# Patient Record
Sex: FEMALE | Race: WHITE | NOT HISPANIC OR LATINO | Employment: OTHER | URBAN - METROPOLITAN AREA
[De-identification: names, ages, dates, MRNs, and addresses within clinical notes are randomized per-mention and may not be internally consistent; named-entity substitution may affect disease eponyms.]

---

## 2018-04-16 LAB
ALBUMIN SERPL BCP-MCNC: 3.9 G/DL (ref 3.5–5.7)
ALP SERPL-CCNC: 45 IU/L (ref 55–165)
ALT SERPL W P-5'-P-CCNC: 18 IU/L (ref 9–28)
ANION GAP SERPL CALCULATED.3IONS-SCNC: 11.9 MM/L
AST SERPL W P-5'-P-CCNC: 25 U/L (ref 7–26)
BASOPHILS # BLD AUTO: 0 X3/UL (ref 0–0.3)
BASOPHILS # BLD AUTO: 0.6 % (ref 0–2)
BILIRUB SERPL-MCNC: 0.4 MG/DL (ref 0.3–1)
BUN SERPL-MCNC: 21 MG/DL (ref 7–25)
CALCIUM SERPL-MCNC: 9.6 MG/DL (ref 8.6–10.5)
CHLORIDE SERPL-SCNC: 103 MM/L (ref 98–107)
CHOLEST SERPL-MCNC: 166 MG/DL (ref 0–200)
CO2 SERPL-SCNC: 32 MM/L (ref 21–31)
CREAT SERPL-MCNC: 0.85 MG/DL (ref 0.6–1.2)
DEPRECATED RDW RBC AUTO: 14.7 % (ref 11.5–14.5)
EGFR (HISTORICAL): > 60 GFR
EGFR AFRICAN AMERICAN (HISTORICAL): > 60 GFR
EOSINOPHIL # BLD AUTO: 0.1 X3/UL (ref 0–0.5)
EOSINOPHIL NFR BLD AUTO: 1.7 % (ref 0–5)
GLUCOSE (HISTORICAL): 89 MG/DL (ref 65–99)
HCT VFR BLD AUTO: 39.2 % (ref 37–47)
HDLC SERPL-MCNC: 67 MG/DL (ref 40–60)
HGB BLD-MCNC: 12.8 G/DL (ref 12–16)
LDLC SERPL CALC-MCNC: 83.1 MG/DL (ref 75–193)
LYMPHOCYTES # BLD AUTO: 1.9 X3/UL (ref 1.2–4.2)
LYMPHOCYTES NFR BLD AUTO: 32.2 % (ref 20.5–51.1)
MCH RBC QN AUTO: 29.8 PG (ref 26–34)
MCHC RBC AUTO-ENTMCNC: 32.6 G/DL (ref 31–36)
MCV RBC AUTO: 91.1 FL (ref 81–99)
MONOCYTES # BLD AUTO: 0.6 X3/UL (ref 0–1)
MONOCYTES NFR BLD AUTO: 10.2 % (ref 1.7–12)
NEUTROPHILS # BLD AUTO: 3.2 X3/UL (ref 1.4–6.5)
NEUTS SEG NFR BLD AUTO: 55.3 % (ref 42.2–75.2)
OSMOLALITY, SERUM (HISTORICAL): 287 MOSM (ref 262–291)
PLATELET # BLD AUTO: 278 X3/UL (ref 130–400)
PMV BLD AUTO: 7.9 FL (ref 8.6–11.7)
POTASSIUM SERPL-SCNC: 3.9 MM/L (ref 3.5–5.5)
RBC # BLD AUTO: 4.31 X6/UL (ref 3.9–5.2)
SODIUM SERPL-SCNC: 143 MM/L (ref 134–143)
T4 TOTAL (HISTORICAL): 8.13 UG/DL (ref 6.1–12.2)
TOTAL PROTEIN (HISTORICAL): 6.3 G/DL (ref 6.4–8.9)
TRIGL SERPL-MCNC: 82 MG/DL (ref 44–166)
TSH SERPL DL<=0.05 MIU/L-ACNC: 1.79 UIU/M (ref 0.45–5.33)
URIC ACID (HISTORICAL): 6.7 MG/DL (ref 2.3–7.6)
VLDL CHOLESTEROL (HISTORICAL): 16 MG/DL (ref 5–51)
WBC # BLD AUTO: 5.9 X3/UL (ref 4.8–10.8)

## 2018-11-14 DIAGNOSIS — I10 ESSENTIAL HYPERTENSION: ICD-10-CM

## 2018-11-14 DIAGNOSIS — R60.9 EDEMA, UNSPECIFIED TYPE: Primary | ICD-10-CM

## 2018-11-14 RX ORDER — FUROSEMIDE 40 MG/1
40 TABLET ORAL DAILY
Qty: 90 TABLET | Refills: 3 | Status: SHIPPED | OUTPATIENT
Start: 2018-11-14 | End: 2019-02-15 | Stop reason: SDUPTHER

## 2018-11-14 RX ORDER — METOPROLOL TARTRATE 100 MG/1
100 TABLET ORAL EVERY 12 HOURS SCHEDULED
Qty: 180 TABLET | Refills: 3 | Status: SHIPPED | OUTPATIENT
Start: 2018-11-14 | End: 2019-02-15 | Stop reason: SDUPTHER

## 2018-11-14 NOTE — TELEPHONE ENCOUNTER
Please check patient's old records through remains in charts if necessary to obtain the proper dosage in medication    She may have 90 day supply with 3 refills on both medications Lasix and metoprolol

## 2019-02-15 DIAGNOSIS — R60.9 EDEMA, UNSPECIFIED TYPE: ICD-10-CM

## 2019-02-15 DIAGNOSIS — I10 ESSENTIAL HYPERTENSION: ICD-10-CM

## 2019-02-15 RX ORDER — FUROSEMIDE 40 MG/1
40 TABLET ORAL DAILY
Qty: 90 TABLET | Refills: 3 | Status: SHIPPED | OUTPATIENT
Start: 2019-02-15 | End: 2019-11-15 | Stop reason: SDUPTHER

## 2019-02-15 RX ORDER — METOPROLOL TARTRATE 100 MG/1
100 TABLET ORAL EVERY 12 HOURS SCHEDULED
Qty: 180 TABLET | Refills: 3 | Status: SHIPPED | OUTPATIENT
Start: 2019-02-15 | End: 2019-11-15 | Stop reason: SDUPTHER

## 2019-02-27 ENCOUNTER — APPOINTMENT (OUTPATIENT)
Dept: LAB | Facility: CLINIC | Age: 74
End: 2019-02-27
Payer: MEDICARE

## 2019-02-27 ENCOUNTER — TRANSCRIBE ORDERS (OUTPATIENT)
Dept: ADMINISTRATIVE | Facility: HOSPITAL | Age: 74
End: 2019-02-27

## 2019-02-27 DIAGNOSIS — E78.5 HYPERLIPIDEMIA, UNSPECIFIED HYPERLIPIDEMIA TYPE: ICD-10-CM

## 2019-02-27 DIAGNOSIS — I10 HYPERTENSION, UNSPECIFIED TYPE: ICD-10-CM

## 2019-02-27 DIAGNOSIS — E78.5 HYPERLIPIDEMIA, UNSPECIFIED HYPERLIPIDEMIA TYPE: Primary | ICD-10-CM

## 2019-02-27 LAB
ALBUMIN SERPL BCP-MCNC: 3.7 G/DL (ref 3.5–5)
ALP SERPL-CCNC: 66 U/L (ref 46–116)
ALT SERPL W P-5'-P-CCNC: 33 U/L (ref 12–78)
ANION GAP SERPL CALCULATED.3IONS-SCNC: 7 MMOL/L (ref 4–13)
AST SERPL W P-5'-P-CCNC: 26 U/L (ref 5–45)
BILIRUB SERPL-MCNC: 0.57 MG/DL (ref 0.2–1)
BUN SERPL-MCNC: 32 MG/DL (ref 5–25)
CALCIUM SERPL-MCNC: 9.3 MG/DL (ref 8.3–10.1)
CHLORIDE SERPL-SCNC: 104 MMOL/L (ref 100–108)
CHOLEST SERPL-MCNC: 196 MG/DL (ref 50–200)
CO2 SERPL-SCNC: 30 MMOL/L (ref 21–32)
CREAT SERPL-MCNC: 0.99 MG/DL (ref 0.6–1.3)
GFR SERPL CREATININE-BSD FRML MDRD: 56 ML/MIN/1.73SQ M
GLUCOSE P FAST SERPL-MCNC: 83 MG/DL (ref 65–99)
HDLC SERPL-MCNC: 74 MG/DL (ref 40–60)
LDLC SERPL CALC-MCNC: 107 MG/DL (ref 0–100)
NONHDLC SERPL-MCNC: 122 MG/DL
POTASSIUM SERPL-SCNC: 4 MMOL/L (ref 3.5–5.3)
PROT SERPL-MCNC: 7.6 G/DL (ref 6.4–8.2)
SODIUM SERPL-SCNC: 141 MMOL/L (ref 136–145)
TRIGL SERPL-MCNC: 74 MG/DL
TSH SERPL DL<=0.05 MIU/L-ACNC: 2.03 UIU/ML (ref 0.36–3.74)

## 2019-02-27 PROCEDURE — 80053 COMPREHEN METABOLIC PANEL: CPT

## 2019-02-27 PROCEDURE — 80061 LIPID PANEL: CPT

## 2019-02-27 PROCEDURE — 84443 ASSAY THYROID STIM HORMONE: CPT

## 2019-02-27 PROCEDURE — 36415 COLL VENOUS BLD VENIPUNCTURE: CPT

## 2019-03-05 ENCOUNTER — OFFICE VISIT (OUTPATIENT)
Dept: FAMILY MEDICINE CLINIC | Facility: CLINIC | Age: 74
End: 2019-03-05
Payer: MEDICARE

## 2019-03-05 VITALS
OXYGEN SATURATION: 94 % | SYSTOLIC BLOOD PRESSURE: 140 MMHG | WEIGHT: 207.4 LBS | HEIGHT: 60 IN | DIASTOLIC BLOOD PRESSURE: 84 MMHG | BODY MASS INDEX: 40.72 KG/M2 | HEART RATE: 64 BPM

## 2019-03-05 DIAGNOSIS — I10 ESSENTIAL HYPERTENSION: Primary | ICD-10-CM

## 2019-03-05 DIAGNOSIS — K58.2 IRRITABLE BOWEL SYNDROME WITH BOTH CONSTIPATION AND DIARRHEA: ICD-10-CM

## 2019-03-05 DIAGNOSIS — E78.2 MIXED HYPERLIPIDEMIA: ICD-10-CM

## 2019-03-05 DIAGNOSIS — M15.9 OSTEOARTHRITIS, GENERALIZED: ICD-10-CM

## 2019-03-05 DIAGNOSIS — F41.1 GENERALIZED ANXIETY DISORDER: ICD-10-CM

## 2019-03-05 PROCEDURE — 99214 OFFICE O/P EST MOD 30 MIN: CPT | Performed by: FAMILY MEDICINE

## 2019-03-05 RX ORDER — MELOXICAM 15 MG/1
15 TABLET ORAL DAILY
COMMUNITY
End: 2019-07-16

## 2019-03-05 RX ORDER — DICYCLOMINE HYDROCHLORIDE 10 MG/1
10 CAPSULE ORAL
COMMUNITY
End: 2019-07-16

## 2019-03-05 RX ORDER — TELMISARTAN 80 MG/1
80 TABLET ORAL DAILY
COMMUNITY
End: 2019-08-13 | Stop reason: SDUPTHER

## 2019-03-05 RX ORDER — MULTIVIT-MIN/IRON FUM/FOLIC AC 7.5 MG-4
1 TABLET ORAL DAILY
COMMUNITY

## 2019-03-05 RX ORDER — ALPRAZOLAM 0.5 MG/1
0.5 TABLET ORAL
COMMUNITY
End: 2019-07-16

## 2019-03-05 NOTE — ASSESSMENT & PLAN NOTE
Irritable bowel syndrome controlled at this time with bowel movements regulated with Bentyl dicyclomine doing relatively well continue with diet follow-up in 4 months no change at this time

## 2019-03-05 NOTE — ASSESSMENT & PLAN NOTE
Generalized anxiety disorder takes alprazolam on occasion as needed for anxiety or lack of sleep she is doing well otherwise continue medication as directed and as needed and follow up at next office visit in 4 months

## 2019-03-05 NOTE — ASSESSMENT & PLAN NOTE
Essential hypertension blood pressure well controlled continue on current medication no change at this time follow-up in 4 months

## 2019-03-05 NOTE — PATIENT INSTRUCTIONS

## 2019-03-05 NOTE — ASSESSMENT & PLAN NOTE
Generalized osteoarthritis she needs to work on weight loss which has been a longstanding issue for her she also has exercise regularly as much as tolerated due to her arthritis she has limitations in how much she can move    She knows to stretch and take the meloxicam as needed for anti-inflammatory pain relief

## 2019-03-05 NOTE — PROGRESS NOTES
Assessment/Plan:       Problem List Items Addressed This Visit        Digestive    Irritable bowel syndrome with both constipation and diarrhea     Irritable bowel syndrome controlled at this time with bowel movements regulated with Bentyl dicyclomine doing relatively well continue with diet follow-up in 4 months no change at this time            Cardiovascular and Mediastinum    Essential hypertension - Primary     Essential hypertension blood pressure well controlled continue on current medication no change at this time follow-up in 4 months         Relevant Medications    telmisartan (MICARDIS) 80 MG tablet       Musculoskeletal and Integument    Osteoarthritis, generalized     Generalized osteoarthritis she needs to work on weight loss which has been a longstanding issue for her she also has exercise regularly as much as tolerated due to her arthritis she has limitations in how much she can move  She knows to stretch and take the meloxicam as needed for anti-inflammatory pain relief            Other    Generalized anxiety disorder     Generalized anxiety disorder takes alprazolam on occasion as needed for anxiety or lack of sleep she is doing well otherwise continue medication as directed and as needed and follow up at next office visit in 4 months         Relevant Medications    ALPRAZolam (XANAX) 0 5 mg tablet    Mixed hyperlipidemia     Patient has hyperlipidemia review of lab work shows her total cholesterol 196 but good HDL is at 68 which is great she will continue on the pravastatin at this time and follow-up lab work in the summer                 Subjective:      Patient ID: Maxi Tyler is a 66 y o  female  Patient presents today for follow-up evaluation blood pressure check and medication renewals and review  Also review of all lab work done recently    She has been doing well overall occasional joint pains and a runny nose recently from the weather otherwise no new problems to discuss at this time       The following portions of the patient's history were reviewed and updated as appropriate: allergies, current medications, past family history, past medical history, past social history, past surgical history and problem list     Review of Systems   Constitutional: Negative for chills, fatigue and fever  HENT: Positive for postnasal drip  Negative for congestion, nosebleeds, rhinorrhea, sinus pressure and sore throat  Eyes: Negative for discharge and redness  Respiratory: Negative for cough and shortness of breath  Cardiovascular: Negative for chest pain, palpitations and leg swelling  Gastrointestinal: Negative for abdominal pain, blood in stool and nausea  Endocrine: Negative for cold intolerance, heat intolerance and polyuria  Genitourinary: Negative for dysuria and frequency  Musculoskeletal: Positive for arthralgias  Negative for back pain and myalgias  Skin: Negative for rash  Neurological: Negative for dizziness, weakness and headaches  Hematological: Negative for adenopathy  Psychiatric/Behavioral: Negative for behavioral problems and sleep disturbance  The patient is not nervous/anxious  Objective:      /84 (BP Location: Left arm, Patient Position: Sitting)   Pulse 64   Ht 5' (1 524 m)   Wt 94 1 kg (207 lb 6 4 oz)   SpO2 94%   BMI 40 51 kg/m²        Physical Exam   Constitutional: She is oriented to person, place, and time  She appears well-developed and well-nourished  No distress  HENT:   Head: Normocephalic and atraumatic  Right Ear: External ear normal    Left Ear: External ear normal    Nose: Nose normal    Mouth/Throat: Oropharynx is clear and moist  No oropharyngeal exudate  Eyes: Pupils are equal, round, and reactive to light  Conjunctivae and EOM are normal  Right eye exhibits no discharge  Left eye exhibits no discharge  No scleral icterus  Neck: Normal range of motion  No JVD present  No thyromegaly present     Cardiovascular: Normal rate, regular rhythm and normal heart sounds  No murmur heard  Pulmonary/Chest: Effort normal  She has no wheezes  She has no rales  She exhibits no tenderness  Abdominal: Soft  Bowel sounds are normal  She exhibits distension  She exhibits no mass  There is no tenderness  Musculoskeletal: Normal range of motion  She exhibits edema and tenderness  She exhibits no deformity  Lymphadenopathy:     She has no cervical adenopathy  Neurological: She is alert and oriented to person, place, and time  She has normal reflexes  She displays normal reflexes  No cranial nerve deficit  Coordination normal    Skin: Skin is warm and dry  No rash noted  Psychiatric: She has a normal mood and affect  Her behavior is normal  Judgment and thought content normal    Nursing note and vitals reviewed  Data:    Laboratory Results: I have personally reviewed the pertinent laboratory results/reports   Radiology/Other Diagnostic Testing Results: I have personally reviewed pertinent reports         Lab Results   Component Value Date    WBC 5 9 04/16/2018    HGB 12 8 04/16/2018    HCT 39 2 04/16/2018    MCV 91 1 04/16/2018     04/16/2018     Lab Results   Component Value Date     04/16/2018    K 4 0 02/27/2019     02/27/2019    CO2 30 02/27/2019    ANIONGAP 11 9 04/16/2018    BUN 32 (H) 02/27/2019    CREATININE 0 99 02/27/2019    GLUF 83 02/27/2019    CALCIUM 9 3 02/27/2019    AST 26 02/27/2019    ALT 33 02/27/2019    ALKPHOS 66 02/27/2019    PROT 6 3 (L) 04/16/2018    BILITOT 0 4 04/16/2018    EGFR 56 02/27/2019     Lab Results   Component Value Date    CHOLESTEROL 196 02/27/2019     Lab Results   Component Value Date    HDL 74 (H) 02/27/2019    HDL 67 (H) 04/16/2018     Lab Results   Component Value Date    LDLCALC 107 (H) 02/27/2019    LDLCALC 83 1 04/16/2018     Lab Results   Component Value Date    TRIG 74 02/27/2019    TRIG 82 04/16/2018     No results found for: [place], [place]  Lab Results   Component Value Date    [id number] 2 030 02/27/2019     No results found for: HGBA1C  No results found for: PSA    Elizabeth Baxter DO

## 2019-03-05 NOTE — ASSESSMENT & PLAN NOTE
Patient has hyperlipidemia review of lab work shows her total cholesterol 196 but good HDL is at 68 which is great she will continue on the pravastatin at this time and follow-up lab work in the summer

## 2019-05-02 ENCOUNTER — TELEPHONE (OUTPATIENT)
Dept: FAMILY MEDICINE CLINIC | Facility: CLINIC | Age: 74
End: 2019-05-02

## 2019-05-02 DIAGNOSIS — Z12.39 BREAST SCREENING, UNSPECIFIED: Primary | ICD-10-CM

## 2019-05-07 DIAGNOSIS — E78.2 MIXED HYPERLIPIDEMIA: Primary | ICD-10-CM

## 2019-05-07 RX ORDER — PRAVASTATIN SODIUM 40 MG
TABLET ORAL
Qty: 90 TABLET | Refills: 3 | Status: SHIPPED | OUTPATIENT
Start: 2019-05-07 | End: 2020-02-18 | Stop reason: ALTCHOICE

## 2019-06-24 ENCOUNTER — HOSPITAL ENCOUNTER (OUTPATIENT)
Dept: MAMMOGRAPHY | Facility: HOSPITAL | Age: 74
Discharge: HOME | End: 2019-06-24
Attending: FAMILY MEDICINE
Payer: MEDICARE

## 2019-06-24 VITALS — BODY MASS INDEX: 40.64 KG/M2 | HEIGHT: 60 IN | WEIGHT: 207 LBS

## 2019-06-24 DIAGNOSIS — Z12.39 BREAST SCREENING, UNSPECIFIED: ICD-10-CM

## 2019-06-24 PROCEDURE — 77067 SCR MAMMO BI INCL CAD: CPT

## 2019-06-24 PROCEDURE — 77063 BREAST TOMOSYNTHESIS BI: CPT

## 2019-07-02 ENCOUNTER — TELEPHONE (OUTPATIENT)
Dept: FAMILY MEDICINE CLINIC | Facility: CLINIC | Age: 74
End: 2019-07-02

## 2019-07-16 ENCOUNTER — OFFICE VISIT (OUTPATIENT)
Dept: FAMILY MEDICINE CLINIC | Facility: CLINIC | Age: 74
End: 2019-07-16
Payer: MEDICARE

## 2019-07-16 VITALS
BODY MASS INDEX: 41.43 KG/M2 | OXYGEN SATURATION: 92 % | TEMPERATURE: 97.9 F | HEIGHT: 60 IN | HEART RATE: 70 BPM | SYSTOLIC BLOOD PRESSURE: 138 MMHG | WEIGHT: 211 LBS | DIASTOLIC BLOOD PRESSURE: 72 MMHG

## 2019-07-16 DIAGNOSIS — M15.9 OSTEOARTHRITIS, GENERALIZED: ICD-10-CM

## 2019-07-16 DIAGNOSIS — M25.472 ANKLE EDEMA, BILATERAL: ICD-10-CM

## 2019-07-16 DIAGNOSIS — M25.471 ANKLE EDEMA, BILATERAL: ICD-10-CM

## 2019-07-16 DIAGNOSIS — E78.2 MIXED HYPERLIPIDEMIA: ICD-10-CM

## 2019-07-16 DIAGNOSIS — I10 ESSENTIAL HYPERTENSION: Primary | ICD-10-CM

## 2019-07-16 PROCEDURE — 99214 OFFICE O/P EST MOD 30 MIN: CPT | Performed by: FAMILY MEDICINE

## 2019-07-16 RX ORDER — LANOLIN ALCOHOL/MO/W.PET/CERES
CREAM (GRAM) TOPICAL DAILY
COMMUNITY

## 2019-07-16 RX ORDER — ASPIRIN 81 MG/1
81 TABLET ORAL DAILY
COMMUNITY

## 2019-07-16 RX ORDER — MULTIVITAMIN WITH IRON
100 TABLET ORAL DAILY
COMMUNITY
End: 2020-02-18 | Stop reason: ALTCHOICE

## 2019-07-16 NOTE — PATIENT INSTRUCTIONS
Edema   WHAT YOU NEED TO KNOW:   Edema is swelling throughout your body  Edema is usually a sign that you are retaining fluid  The swelling may be caused by heart failure or kidney, thyroid, or liver disease  It may also be caused by medicines such as antidepressants, blood pressure medicines, or hormones  Sudden swelling around the lips or face may be a sign of a severe allergic reaction  Swelling of an arm or leg may be caused by blockage of your veins  DISCHARGE INSTRUCTIONS:   Return to the emergency department if:   · You have shortness of breath at rest, especially when you lie down  · You cough up pink, foamy sputum  · You have chest pain  · Your heartbeat is fast or uneven  Contact your healthcare provider if:   · The swollen area feels cold and is pale or blue in color  · The swollen area feels warm, painful, and is red in color  · You have increased swelling or swelling in other parts of your body  · You have questions or concerns about your condition or care  Medicines:   · Medicines  help to get rid of extra body fluid  · Take your medicine as directed  Contact your healthcare provider if you think your medicine is not helping or if you have side effects  Tell him or her if you are allergic to any medicine  Keep a list of the medicines, vitamins, and herbs you take  Include the amounts, and when and why you take them  Bring the list or the pill bottles to follow-up visits  Carry your medicine list with you in case of an emergency  Follow up with your healthcare provider as directed:  Write down your questions so you remember to ask them during your visits  Manage edema:   · Elevate  your arms or legs as directed  Raise them above the level of your heart as often as you can  This will help decrease swelling and pain  Prop them on pillows or blankets to keep them elevated comfortably  · Wear pressure stockings as directed    The stockings are tight and put pressure on your legs  This helps to keep fluid from collecting in your legs or ankles  · Limit your salt intake  Salt causes your body to hold water  Ask about any other changes to your diet  · Stay active  Do not stand or sit for long periods of time  Ask your healthcare provider about the best exercise plan for you  · Keep your skin moist  using lotion, cream, or ointment  Ask your healthcare provider what to use and how often to use it  © 2017 [address] [facility] Information is for End User's use only and may not be sold, redistributed or otherwise used for commercial purposes  All illustrations and images included in CareNotes® are the copyrighted property of A D A M , Inc  or Samuel Huffman  The above information is an  only  It is not intended as medical advice for individual conditions or treatments  Talk to your doctor, nurse or pharmacist before following any medical regimen to see if it is safe and effective for you

## 2019-07-16 NOTE — ASSESSMENT & PLAN NOTE
Mixed hyperlipidemia continue with current medication at this time avoiding saturated fats continue with Pravachol 40 mg daily and check lipid profile in 4 months

## 2019-07-16 NOTE — PROGRESS NOTES
Assessment/Plan:       Problem List Items Addressed This Visit        Cardiovascular and Mediastinum    Essential hypertension - Primary     Essential hypertension under relatively good control at this time she needs to avoid sodium recently was traveling on vacation at the [facility] and notes that her feet have become more swollen I do recommend avoiding sodium in her diet this will help with the foot edema as well as keeping the blood pressure under better control  She will continue with her current medications at this time no changes            Musculoskeletal and Integument    Osteoarthritis, generalized     Generalized osteoarthritis continue with current regimen keep weight down exercise regularly and use ibuprofen as needed p r n  For arthritic flare ups            Other    Mixed hyperlipidemia     Mixed hyperlipidemia continue with current medication at this time avoiding saturated fats continue with Pravachol 40 mg daily and check lipid profile in 4 months         Ankle edema, bilateral     Bilateral ankle edema at this point try the copperfit support socks as well as proper shoes she will be traveling to [facility] taking a cruise and coming back to [place] over 16 day course she will need to wear good shoes and support socks for this she will be following up with Podiatry as well I feel that this is due to venous insufficiency valvular incompetence at the lower level her legs gastroc region calf muscles all show no edema she has some varicosities on both lower extremities the right leg is worse  She is already taking Lasix , I dont want to add another diuretic to her regimen her blood pressure is under good control  I will schedule her for a venous ultrasound                 Subjective:      Patient ID: Oksana Marroquin is a 66 y o  female      Patient presents today for general checkup blood pressure review medication renewals she recently returned from the [facility] and notes that her feet have been more swollen and would like this addressed at this point      The following portions of the patient's history were reviewed and updated as appropriate: allergies, current medications, past family history, past medical history, past social history, past surgical history and problem list     Review of Systems   Constitutional: Negative for chills, fatigue and fever  HENT: Negative for congestion, nosebleeds, rhinorrhea, sinus pressure and sore throat  Eyes: Negative for discharge and redness  Respiratory: Negative for cough and shortness of breath  Cardiovascular: Positive for leg swelling  Negative for chest pain and palpitations  Gastrointestinal: Negative for abdominal pain, blood in stool and nausea  Endocrine: Negative for cold intolerance, heat intolerance and polyuria  Genitourinary: Negative for dysuria and frequency  Musculoskeletal: Negative for arthralgias, back pain and myalgias  Skin: Negative for rash  Neurological: Negative for dizziness, weakness and headaches  Hematological: Negative for adenopathy  Psychiatric/Behavioral: Negative for behavioral problems and sleep disturbance  The patient is not nervous/anxious  Objective:      /72 (BP Location: Left arm, Patient Position: Sitting)   Pulse 70   Temp 97 9 °F (36 6 °C) (Tympanic)   Ht 5' (1 524 m)   Wt 95 7 kg (211 lb)   SpO2 92%   BMI 41 21 kg/m²        Physical Exam   Constitutional: She is oriented to person, place, and time  She appears well-developed and well-nourished  No distress  HENT:   Head: Normocephalic and atraumatic  Right Ear: External ear normal    Left Ear: External ear normal    Nose: Nose normal    Mouth/Throat: Oropharynx is clear and moist  No oropharyngeal exudate  Eyes: Pupils are equal, round, and reactive to light  Conjunctivae and EOM are normal  Right eye exhibits no discharge  Left eye exhibits no discharge  No scleral icterus  Neck: Normal range of motion   No JVD present  No thyromegaly present  Cardiovascular: Normal rate, regular rhythm and normal heart sounds  No murmur heard  Pulmonary/Chest: Effort normal  She has no wheezes  She has no rales  She exhibits no tenderness  Abdominal: Soft  Bowel sounds are normal  She exhibits no distension and no mass  There is no tenderness  Musculoskeletal: Normal range of motion  She exhibits no edema, tenderness or deformity  Lymphadenopathy:     She has no cervical adenopathy  Neurological: She is alert and oriented to person, place, and time  She has normal reflexes  She displays normal reflexes  No cranial nerve deficit  Coordination normal    Skin: Skin is warm and dry  No rash noted  Psychiatric: She has a normal mood and affect  Her behavior is normal  Judgment and thought content normal    Nursing note and vitals reviewed  Data:    Laboratory Results: I have personally reviewed the pertinent laboratory results/reports   Radiology/Other Diagnostic Testing Results: I have personally reviewed pertinent reports         Lab Results   Component Value Date    WBC 5 9 04/16/2018    HGB 12 8 04/16/2018    HCT 39 2 04/16/2018    MCV 91 1 04/16/2018     04/16/2018     Lab Results   Component Value Date     04/16/2018    K 4 0 02/27/2019     02/27/2019    CO2 30 02/27/2019    ANIONGAP 11 9 04/16/2018    BUN 32 (H) 02/27/2019    CREATININE 0 99 02/27/2019    GLUF 83 02/27/2019    CALCIUM 9 3 02/27/2019    AST 26 02/27/2019    ALT 33 02/27/2019    ALKPHOS 66 02/27/2019    PROT 6 3 (L) 04/16/2018    BILITOT 0 4 04/16/2018    EGFR 56 02/27/2019     Lab Results   Component Value Date    CHOLESTEROL 196 02/27/2019     Lab Results   Component Value Date    HDL 74 (H) 02/27/2019    HDL 67 (H) 04/16/2018     Lab Results   Component Value Date    LDLCALC 107 (H) 02/27/2019    LDLCALC 83 1 04/16/2018     Lab Results   Component Value Date    TRIG 74 02/27/2019    TRIG 82 04/16/2018     No results found for: [place], [place]  Lab Results   Component Value Date    [id number] 2 030 02/27/2019     No results found for: HGBA1C  No results found for: KEESHA Garcia DO

## 2019-07-16 NOTE — ASSESSMENT & PLAN NOTE
Generalized osteoarthritis continue with current regimen keep weight down exercise regularly and use ibuprofen as needed p r n   For arthritic flare ups

## 2019-07-16 NOTE — ASSESSMENT & PLAN NOTE
Bilateral ankle edema at this point try the copperfit support socks as well as proper shoes she will be traveling to [facility] taking a cruise and coming back to [place] over 16 day course she will need to wear good shoes and support socks for this she will be following up with Podiatry as well I feel that this is due to venous insufficiency valvular incompetence at the lower level her legs gastroc region calf muscles all show no edema she has some varicosities on both lower extremities the right leg is worse  She is already taking Lasix , I dont want to add another diuretic to her regimen her blood pressure is under good control    I will schedule her for a venous ultrasound

## 2019-07-16 NOTE — PROGRESS NOTES
BMI Counseling: Body mass index is 41 21 kg/m²  Discussed the patient's BMI with her  The BMI is above average  BMI counseling and education was provided to the patient  Nutrition recommendations include decreasing overall calorie intake

## 2019-07-16 NOTE — ASSESSMENT & PLAN NOTE
Essential hypertension under relatively good control at this time she needs to avoid sodium recently was traveling on vacation at the [facility] and notes that her feet have become more swollen I do recommend avoiding sodium in her diet this will help with the foot edema as well as keeping the blood pressure under better control    She will continue with her current medications at this time no changes

## 2019-08-13 DIAGNOSIS — I10 ESSENTIAL HYPERTENSION: Primary | ICD-10-CM

## 2019-08-13 RX ORDER — TELMISARTAN 80 MG/1
TABLET ORAL
Qty: 90 TABLET | Refills: 1 | Status: SHIPPED | OUTPATIENT
Start: 2019-08-13 | End: 2019-11-15 | Stop reason: SDUPTHER

## 2019-09-28 ENCOUNTER — OFFICE VISIT (OUTPATIENT)
Dept: URGENT CARE | Facility: CLINIC | Age: 74
End: 2019-09-28
Payer: MEDICARE

## 2019-09-28 VITALS
DIASTOLIC BLOOD PRESSURE: 72 MMHG | WEIGHT: 210 LBS | OXYGEN SATURATION: 98 % | HEIGHT: 60 IN | BODY MASS INDEX: 41.23 KG/M2 | HEART RATE: 76 BPM | RESPIRATION RATE: 18 BRPM | TEMPERATURE: 97.4 F | SYSTOLIC BLOOD PRESSURE: 140 MMHG

## 2019-09-28 DIAGNOSIS — J01.90 ACUTE SINUSITIS, RECURRENCE NOT SPECIFIED, UNSPECIFIED LOCATION: Primary | ICD-10-CM

## 2019-09-28 PROCEDURE — 99213 OFFICE O/P EST LOW 20 MIN: CPT | Performed by: PHYSICIAN ASSISTANT

## 2019-09-28 PROCEDURE — G0463 HOSPITAL OUTPT CLINIC VISIT: HCPCS | Performed by: PHYSICIAN ASSISTANT

## 2019-09-28 RX ORDER — AMOXICILLIN AND CLAVULANATE POTASSIUM 875; 125 MG/1; MG/1
1 TABLET, FILM COATED ORAL EVERY 12 HOURS SCHEDULED
Qty: 20 TABLET | Refills: 0 | Status: SHIPPED | OUTPATIENT
Start: 2019-09-28 | End: 2019-10-08

## 2019-09-28 RX ORDER — DEXTROMETHORPHAN HYDROBROMIDE AND PROMETHAZINE HYDROCHLORIDE 15; 6.25 MG/5ML; MG/5ML
5 SOLUTION ORAL 4 TIMES DAILY PRN
Qty: 118 ML | Refills: 0 | Status: SHIPPED | OUTPATIENT
Start: 2019-09-28 | End: 2020-02-18 | Stop reason: ALTCHOICE

## 2019-09-28 NOTE — PROGRESS NOTES
[facility]        NAME: Lili Mora is a 66 y o  female  : 1945    MRN: [id number]  DATE: 2019  TIME: 10:27 AM    Assessment and Plan   Acute sinusitis, recurrence not specified, unspecified location [J01 90]  1  Acute sinusitis, recurrence not specified, unspecified location  amoxicillin-clavulanate (AUGMENTIN) 875-125 mg per tablet    Promethazine-DM (PHENERGAN-DM) 6 25-15 mg/5 mL oral syrup     Offered flu swab; patient denied    Patient Instructions     Follow up with PCP in 3-5 days  Proceed to  ER if symptoms worsen  Chief Complaint     Chief Complaint   Patient presents with    Cough     moist np cough  denies fevers   Cold Like Symptoms     came home from cruise on Sat  Wed started with chest and nasal congestion  states she was told there were people on the cruise that had the flu  History of Present Illness       73-year-old female presents for evaluation of cold-like  Patient complaining of a cough that is nonproductive  Patient states she was on a 21 day cruise  She returned on Saturday  Patient states 4 days ago she started with chest nasal congestion  Patient was exposed to positive flu  She denies any body aches  Denies fever or chills  Denies shortness of breath or wheezing  Review of Systems   Review of Systems   Constitutional: Negative for chills, fatigue and fever  HENT: Positive for congestion  Negative for ear pain, sinus pain, sore throat and trouble swallowing  Eyes: Negative for pain, discharge and redness  Respiratory: Positive for cough  Negative for chest tightness, shortness of breath and wheezing  Cardiovascular: Negative for chest pain, palpitations and leg swelling  Gastrointestinal: Negative for abdominal pain, diarrhea, nausea and vomiting  Musculoskeletal: Negative for arthralgias, joint swelling and myalgias  Skin: Negative for rash     Neurological: Negative for dizziness, weakness, numbness and headaches  Current Medications       Current Outpatient Medications:     aspirin (ECOTRIN LOW STRENGTH) 81 mg EC tablet, Take 81 mg by mouth daily, Disp: , Rfl:     Calcium Carb-Cholecalciferol (CALCIUM 600+D3 PO), Take by mouth, Disp: , Rfl:     cyanocobalamin (VITAMIN B-12) 1,000 mcg tablet, Take by mouth daily, Disp: , Rfl:     furosemide (LASIX) 40 mg tablet, Take 1 tablet (40 mg total) by mouth daily for 90 days, Disp: 90 tablet, Rfl: 3    metoprolol tartrate (LOPRESSOR) 100 mg tablet, Take 1 tablet (100 mg total) by mouth every 12 (twelve) hours, Disp: 180 tablet, Rfl: 3    Multiple Vitamins-Minerals (MULTIVITAMIN WITH MINERALS) tablet, Take 1 tablet by mouth daily, Disp: , Rfl:     pyridoxine (VITAMIN B6) 100 mg tablet, Take 100 mg by mouth daily, Disp: , Rfl:     telmisartan (MICARDIS) 80 MG tablet, Take 1 tablet by mouth once daily, Disp: 90 tablet, Rfl: 1    amoxicillin-clavulanate (AUGMENTIN) 875-125 mg per tablet, Take 1 tablet by mouth every 12 (twelve) hours for 10 days, Disp: 20 tablet, Rfl: 0    pravastatin (PRAVACHOL) 40 mg tablet, Take 1 tablet by mouth daily   (Patient not taking: Reported on 9/28/2019), Disp: 90 tablet, Rfl: 3    Promethazine-DM (PHENERGAN-DM) 6 25-15 mg/5 mL oral syrup, Take 5 mL by mouth 4 (four) times a day as needed for cough, Disp: 118 mL, Rfl: 0    Current Allergies     Allergies as of 09/28/2019 - Reviewed 09/28/2019   Allergen Reaction Noted    Altace [ramipril]  03/05/2019            The following portions of the patient's history were reviewed and updated as appropriate: allergies, current medications, past family history, past medical history, past social history, past surgical history and problem list      Past Medical History:   Diagnosis Date    Hyperlipidemia     Hypertension        Past Surgical History:   Procedure Laterality Date    JOINT REPLACEMENT         Family History   Problem Relation Age of Onset    No Known Problems Mother     No Known Problems Sister     No Known Problems Daughter     No Known Problems Daughter     No Known Problems Daughter     No Known Problems Maternal Aunt     No Known Problems Maternal Aunt     Cancer Paternal Aunt          Medications have been verified  Objective   /72 (BP Location: Left arm, Patient Position: Sitting)   Pulse 76   Temp (!) 97 4 °F (36 3 °C) (Tympanic)   Resp 18   Ht 5' (1 524 m)   Wt 95 3 kg (210 lb)   SpO2 98%   BMI 41 01 kg/m²        Physical Exam     Physical Exam   Constitutional: She appears well-developed and well-nourished  HENT:   Head: Normocephalic  Right Ear: Hearing and tympanic membrane normal    Left Ear: Hearing and tympanic membrane normal    Nose: No mucosal edema  Mouth/Throat: Uvula is midline and mucous membranes are normal  Posterior oropharyngeal erythema present  Cardiovascular: Normal rate and regular rhythm  Pulmonary/Chest: Effort normal and breath sounds normal    Nursing note and vitals reviewed

## 2019-11-15 DIAGNOSIS — R60.9 EDEMA, UNSPECIFIED TYPE: ICD-10-CM

## 2019-11-15 DIAGNOSIS — I10 ESSENTIAL HYPERTENSION: ICD-10-CM

## 2019-11-15 RX ORDER — TELMISARTAN 80 MG/1
80 TABLET ORAL DAILY
Qty: 90 TABLET | Refills: 2 | Status: SHIPPED | OUTPATIENT
Start: 2019-11-15 | End: 2020-03-27 | Stop reason: SDUPTHER

## 2019-11-15 RX ORDER — FUROSEMIDE 40 MG/1
40 TABLET ORAL DAILY
Qty: 90 TABLET | Refills: 3 | Status: SHIPPED | OUTPATIENT
Start: 2019-11-15 | End: 2020-03-27 | Stop reason: SDUPTHER

## 2019-11-15 RX ORDER — METOPROLOL TARTRATE 100 MG/1
100 TABLET ORAL EVERY 12 HOURS SCHEDULED
Qty: 180 TABLET | Refills: 3 | Status: SHIPPED | OUTPATIENT
Start: 2019-11-15 | End: 2020-03-27 | Stop reason: SDUPTHER

## 2020-02-18 ENCOUNTER — OFFICE VISIT (OUTPATIENT)
Dept: FAMILY MEDICINE CLINIC | Facility: CLINIC | Age: 75
End: 2020-02-18
Payer: MEDICARE

## 2020-02-18 VITALS
WEIGHT: 220 LBS | TEMPERATURE: 98.1 F | BODY MASS INDEX: 43.19 KG/M2 | OXYGEN SATURATION: 97 % | HEART RATE: 83 BPM | HEIGHT: 60 IN | DIASTOLIC BLOOD PRESSURE: 78 MMHG | SYSTOLIC BLOOD PRESSURE: 132 MMHG

## 2020-02-18 DIAGNOSIS — G89.29 CHRONIC RIGHT SHOULDER PAIN: ICD-10-CM

## 2020-02-18 DIAGNOSIS — M25.511 CHRONIC RIGHT SHOULDER PAIN: ICD-10-CM

## 2020-02-18 DIAGNOSIS — M25.471 ANKLE EDEMA, BILATERAL: ICD-10-CM

## 2020-02-18 DIAGNOSIS — M25.472 ANKLE EDEMA, BILATERAL: ICD-10-CM

## 2020-02-18 DIAGNOSIS — M15.9 OSTEOARTHRITIS, GENERALIZED: ICD-10-CM

## 2020-02-18 DIAGNOSIS — K58.2 IRRITABLE BOWEL SYNDROME WITH BOTH CONSTIPATION AND DIARRHEA: Primary | ICD-10-CM

## 2020-02-18 DIAGNOSIS — E78.2 MIXED HYPERLIPIDEMIA: ICD-10-CM

## 2020-02-18 DIAGNOSIS — I10 ESSENTIAL HYPERTENSION: ICD-10-CM

## 2020-02-18 DIAGNOSIS — F41.1 GENERALIZED ANXIETY DISORDER: ICD-10-CM

## 2020-02-18 PROCEDURE — 99214 OFFICE O/P EST MOD 30 MIN: CPT | Performed by: FAMILY MEDICINE

## 2020-02-18 PROCEDURE — 3075F SYST BP GE 130 - 139MM HG: CPT | Performed by: FAMILY MEDICINE

## 2020-02-18 PROCEDURE — 1160F RVW MEDS BY RX/DR IN RCRD: CPT | Performed by: FAMILY MEDICINE

## 2020-02-18 PROCEDURE — 3008F BODY MASS INDEX DOCD: CPT | Performed by: FAMILY MEDICINE

## 2020-02-18 PROCEDURE — 3078F DIAST BP <80 MM HG: CPT | Performed by: FAMILY MEDICINE

## 2020-02-18 PROCEDURE — 1036F TOBACCO NON-USER: CPT | Performed by: FAMILY MEDICINE

## 2020-02-18 NOTE — PATIENT INSTRUCTIONS
Heart Healthy Diet   WHAT YOU NEED TO KNOW:   A heart healthy diet is an eating plan low in total fat, unhealthy fats, and sodium (salt)  A heart healthy diet helps decrease your risk for heart disease and stroke  Limit the amount of fat you eat to 25% to 35% of your total daily calories  Limit sodium to less than 2,300 mg each day  DISCHARGE INSTRUCTIONS:   Healthy fats:  Healthy fats can help improve cholesterol levels  The risk for heart disease is decreased when cholesterol levels are normal  Choose healthy fats, such as the following:  · Unsaturated fat  is found in foods such as soybean, canola, olive, corn, and safflower oils  It is also found in soft tub margarine that is made with liquid vegetable oil  · Omega-3 fat  is found in certain fish, such as salmon, tuna, and trout, and in walnuts and flaxseed  Unhealthy fats:  Unhealthy fats can cause unhealthy cholesterol levels in your blood and increase your risk of heart disease  Limit unhealthy fats, such as the following:  · Cholesterol  is found in animal foods, such as eggs and lobster, and in dairy products made from whole milk  Limit cholesterol to less than 300 milligrams (mg) each day  You may need to limit cholesterol to 200 mg each day if you have heart disease  · Saturated fat  is found in meats, such as d[place] and hamburger  It is also found in chicken or turkey skin, whole milk, and butter  Limit saturated fat to less than 7% of your total daily calories  Limit saturated fat to less than 6% if you have heart disease or are at increased risk for it  · Trans fat  is found in packaged foods, such as potato chips and cookies  It is also in hard margarine, some fried foods, and shortening  Avoid trans fats as much as possible    Heart healthy foods and drinks to include:  Ask your dietitian or healthcare provider how many servings to have from each of the following food groups:  · Grains:      ¨ Whole-wheat breads, cereals, and pastas, and brown rice    ¨ Low-fat, low-sodium crackers and chips    · Vegetables:      ¨ Broccoli, green beans, green peas, and spinach    ¨ Collards, kale, and lima beans    ¨ Carrots, sweet potatoes, tomatoes, and peppers    ¨ Canned vegetables with no salt added    · Fruits:      ¨ Bananas, peaches, pears, and pineapple    ¨ Grapes, raisins, and dates    ¨ Oranges, tangerines, grapefruit, orange juice, and grapefruit juice    ¨ Apricots, mangoes, melons, and papaya    ¨ Raspberries and strawberries    ¨ Canned fruit with no added sugar    · Low-fat dairy products:      ¨ Nonfat (skim) milk, 1% milk, and low-fat almond, cashew, or soy milks fortified with calcium    ¨ Low-fat cheese, regular or frozen yogurt, and cottage cheese    · Meats and proteins , such as lean cuts of beef and pork (loin, leg, round), skinless chicken and turkey, legumes, soy products, egg whites, and nuts  Foods and drinks to limit or avoid:  Ask your dietitian or healthcare provider about these and other foods that are high in unhealthy fat, sodium, and sugar:  · Snack or packaged foods , such as frozen dinners, cookies, macaroni and cheese, and cereals with more than 300 mg of sodium per serving    · Canned or dry mixes  for cakes, soups, sauces, or gravies    · Vegetables with added sodium , such as instant potatoes, vegetables with added sauces, or regular canned vegetables    · Other foods high in sodium , such as ketchup, barbecue sauce, salad dressing, pickles, olives, soy sauce, and miso    · High-fat dairy foods  such as whole or 2% milk, cream cheese, or sour cream, and cheeses     · High-fat protein foods  such as high-fat cuts of beef (T-bone steaks, ribs), chicken or turkey with skin, and organ meats, such as liver    · Cured or smoked meats , such as hot dogs, davenport, and sausage    · Unhealthy fats and oils , such as butter, stick margarine, shortening, and cooking oils such as coconut or palm oil    · Food and drinks high in sugar , such as soft drinks (soda), sports drinks, sweetened tea, candy, cake, cookies, pies, and doughnuts  Other diet guidelines to follow:   · Eat more foods containing omega-3 fats  Eat fish high in omega-3 fats at least 2 times a week  · Limit alcohol  Too much alcohol can damage your heart and raise your blood pressure  Women should limit alcohol to 1 drink a day  Men should limit alcohol to 2 drinks a day  A drink of alcohol is 12 ounces of beer, 5 ounces of wine, or 1½ ounces of liquor  · Choose low-sodium foods  High-sodium foods can lead to high blood pressure  Add little or no salt to food you prepare  Use herbs and spices in place of salt  · Eat more fiber  to help lower cholesterol levels  Eat at least 5 servings of fruits and vegetables each day  Eat 3 ounces of whole-grain foods each day  Legumes (beans) are also a good source of fiber  Lifestyle guidelines:   · Do not smoke  Nicotine and other chemicals in cigarettes and cigars can cause lung and heart damage  Ask your healthcare provider for information if you currently smoke and need help to quit  E-cigarettes or smokeless tobacco still contain nicotine  Talk to your healthcare provider before you use these products  · Exercise regularly  to help you maintain a healthy weight and improve your blood pressure and cholesterol levels  Ask your healthcare provider about the best exercise plan for you  Do not start an exercise program without asking your healthcare provider  Follow up with your healthcare provider as directed:  Write down your questions so you remember to ask them during your visits  © 2017 [employer] Information is for End User's use only and may not be sold, redistributed or otherwise used for commercial purposes  All illustrations and images included in CareNotes® are the copyrighted property of A D A M [employer]  or Samuel Huffman  The above information is an  only   It is not intended as medical advice for individual conditions or treatments  Talk to your doctor, nurse or pharmacist before following any medical regimen to see if it is safe and effective for you

## 2020-02-18 NOTE — ASSESSMENT & PLAN NOTE
Irritable bowel syndrome has been stable without worsening exacerbation I do recommend weight reduction she gained 10 lb since September his office visit and will need to work on this going into the spring I will re-evaluate her in June and hope for better numbers on her weight with improved exercise and diet restrictions continue with fiber in the diet

## 2020-02-18 NOTE — PROGRESS NOTES
Assessment/Plan:       Problem List Items Addressed This Visit        Digestive    Irritable bowel syndrome with both constipation and diarrhea - Primary      Irritable bowel syndrome has been stable without worsening exacerbation I do recommend weight reduction she gained 10 lb since September his office visit and will need to work on this going into the spring I will re-evaluate her in June and hope for better numbers on her weight with improved exercise and diet restrictions continue with fiber in the diet            Cardiovascular and Mediastinum    Essential hypertension      Hypertension controlled on current medication continue with metoprolol 100 mg along with telmisartan 80 mg and follow up with me at next scheduled office visit in 4 months         Relevant Orders    CBC and differential    Comprehensive metabolic panel    Lipid panel    TSH, 3rd generation with Free T4 reflex       Musculoskeletal and Integument    Osteoarthritis, generalized      Generalized osteoarthritis with pain in her knees and feet mild pain in her hips and back she will continue to work on daily activity I recommend daily exercise stretching keep weight down and follow up with me at next office visit            Other    Generalized anxiety disorder      Generalized anxiety disorder stable at this point without medication sleep patterns are stable         Mixed hyperlipidemia      Mixed hyperlipidemia continue with heart healthy diet information follow-up with me at next office visit she will be due for laboratory work         Relevant Orders    Lipid panel    Ankle edema, bilateral    Chronic right shoulder pain     Right shoulder pain with tendinitis and additionally arthritic changes chronically creating more pain for her with stiffening at this point and I recommend physical therapy for her if this does not improve significantly she will be further referred to orthopedics    Re-evaluate this in 4 weeks         Relevant Orders Ambulatory referral to Physical Therapy            Subjective:      Patient ID: Chan Mar is a 66 y o  female  Patient presents for general checkup review of medications and complains of arm pain unknown etiology denies injury  Patient recently took a cruise to the [place] it which had to be cut short due to a viral illness that broke out gastroenteritis creating diarrhea for most of the cruise passenger's they had to cut the ship cruise short by several days they missed many of the eyelids and came back to [facility] 3 days early she was sick with diarrhea from this her  was okay      The following portions of the patient's history were reviewed and updated as appropriate: allergies, current medications, past family history, past medical history, past social history, past surgical history and problem list     Review of Systems   Constitutional: Negative for chills, fatigue and fever  HENT: Negative for congestion, nosebleeds, rhinorrhea, sinus pressure and sore throat  Eyes: Negative for discharge and redness  Respiratory: Negative for cough and shortness of breath  Cardiovascular: Negative for chest pain, palpitations and leg swelling  Gastrointestinal: Negative for abdominal pain, blood in stool and nausea  Endocrine: Negative for cold intolerance, heat intolerance and polyuria  Genitourinary: Negative for dysuria and frequency  Musculoskeletal: Positive for myalgias  Negative for arthralgias and back pain  Skin: Negative for rash  Neurological: Negative for dizziness, weakness and headaches  Hematological: Negative for adenopathy  Psychiatric/Behavioral: Negative for behavioral problems and sleep disturbance  The patient is not nervous/anxious            Objective:      /78   Pulse 83   Temp 98 1 °F (36 7 °C)   Ht 5' (1 524 m)   Wt 99 8 kg (220 lb)   SpO2 97%   BMI 42 97 kg/m²        Physical Exam   Constitutional: She is oriented to person, place, and time  She appears well-developed and well-nourished  No distress  HENT:   Head: Normocephalic and atraumatic  Right Ear: External ear normal    Left Ear: External ear normal    Nose: Nose normal    Mouth/Throat: Oropharynx is clear and moist  No oropharyngeal exudate  Eyes: Pupils are equal, round, and reactive to light  Conjunctivae and EOM are normal  Right eye exhibits no discharge  Left eye exhibits no discharge  No scleral icterus  Neck: Normal range of motion  No JVD present  No thyromegaly present  Cardiovascular: Normal rate, regular rhythm and normal heart sounds  No murmur heard  Pulmonary/Chest: Effort normal  She has no wheezes  She has no rales  She exhibits no tenderness  Abdominal: Soft  Bowel sounds are normal  She exhibits no distension and no mass  There is no tenderness  Musculoskeletal: Normal range of motion  She exhibits no edema, tenderness or deformity  Lymphadenopathy:     She has no cervical adenopathy  Neurological: She is alert and oriented to person, place, and time  She has normal reflexes  She displays normal reflexes  No cranial nerve deficit  Coordination normal    Skin: Skin is warm and dry  No rash noted  Psychiatric: She has a normal mood and affect  Her behavior is normal  Judgment and thought content normal    Nursing note and vitals reviewed  Data:    Laboratory Results: I have personally reviewed the pertinent laboratory results/reports   Radiology/Other Diagnostic Testing Results: I have personally reviewed pertinent reports         Lab Results   Component Value Date    WBC 5 9 04/16/2018    HGB 12 8 04/16/2018    HCT 39 2 04/16/2018    MCV 91 1 04/16/2018     04/16/2018     Lab Results   Component Value Date     04/16/2018    K 4 0 02/27/2019     02/27/2019    CO2 30 02/27/2019    ANIONGAP 11 9 04/16/2018    BUN 32 (H) 02/27/2019    CREATININE 0 99 02/27/2019    GLUF 83 02/27/2019    CALCIUM 9 3 02/27/2019    AST 26 02/27/2019 ALT 33 02/27/2019    ALKPHOS 66 02/27/2019    PROT 6 3 (L) 04/16/2018    BILITOT 0 4 04/16/2018    EGFR 56 02/27/2019     Lab Results   Component Value Date    CHOLESTEROL 196 02/27/2019     Lab Results   Component Value Date    HDL 74 (H) 02/27/2019    HDL 67 (H) 04/16/2018     Lab Results   Component Value Date    LDLCALC 107 (H) 02/27/2019    LDLCALC 83 1 04/16/2018     Lab Results   Component Value Date    TRIG 74 02/27/2019    TRIG 82 04/16/2018     No results found for: [place], [place]  Lab Results   Component Value Date    MCQ4EVFYSRHJ 2 030 02/27/2019     No results found for: HGBA1C  No results found for: KEESHA Garcia DO

## 2020-02-18 NOTE — ASSESSMENT & PLAN NOTE
Hypertension controlled on current medication continue with metoprolol 100 mg along with telmisartan 80 mg and follow up with me at next scheduled office visit in 4 months

## 2020-02-18 NOTE — ASSESSMENT & PLAN NOTE
Mixed hyperlipidemia continue with heart healthy diet information follow-up with me at next office visit she will be due for laboratory work

## 2020-02-18 NOTE — ASSESSMENT & PLAN NOTE
Right shoulder pain with tendinitis and additionally arthritic changes chronically creating more pain for her with stiffening at this point and I recommend physical therapy for her if this does not improve significantly she will be further referred to orthopedics    Re-evaluate this in 4 weeks

## 2020-02-18 NOTE — ASSESSMENT & PLAN NOTE
Generalized osteoarthritis with pain in her knees and feet mild pain in her hips and back she will continue to work on daily activity I recommend daily exercise stretching keep weight down and follow up with me at next office visit

## 2020-02-27 ENCOUNTER — EVALUATION (OUTPATIENT)
Dept: PHYSICAL THERAPY | Age: 75
End: 2020-02-27
Payer: MEDICARE

## 2020-02-27 DIAGNOSIS — G89.29 CHRONIC RIGHT SHOULDER PAIN: Primary | ICD-10-CM

## 2020-02-27 DIAGNOSIS — M25.511 CHRONIC RIGHT SHOULDER PAIN: Primary | ICD-10-CM

## 2020-02-27 PROCEDURE — 97110 THERAPEUTIC EXERCISES: CPT | Performed by: PHYSICAL THERAPIST

## 2020-02-27 PROCEDURE — 97140 MANUAL THERAPY 1/> REGIONS: CPT | Performed by: PHYSICAL THERAPIST

## 2020-02-27 PROCEDURE — 97014 ELECTRIC STIMULATION THERAPY: CPT | Performed by: PHYSICAL THERAPIST

## 2020-02-27 PROCEDURE — 97162 PT EVAL MOD COMPLEX 30 MIN: CPT | Performed by: PHYSICAL THERAPIST

## 2020-02-27 NOTE — PROGRESS NOTES
PT Evaluation     Today's date: 2020  Patient name: Shekhar Martinez  : 1945  MRN: [id number]  Referring provider: Amy Painting DO  Dx:   Encounter Diagnosis     ICD-10-CM    1  Chronic right shoulder pain M25 511     G89 29        Start Time: 1245  Stop Time: 1345  Total time in clinic (min): 60 minutes    Assessment  Assessment details: Shekhar Martinez is a 66 y o  female who presents with pain, decreased strength, decreased ROM and decreased joint mobility  Due to these impairments, Patient has difficulty performing a/iadls  Patient's clinical presentation is consistent with their referring diagnosis of right shoulder chronic pain  Patient had long history of pain and weakness in right shoulder with crepitus  Patient would benefit from skilled physical therapy to address their aforementioned impairments, improve their level of function and to improve their overall quality of life  Impairments: abnormal or restricted ROM, activity intolerance, impaired physical strength, lacks appropriate home exercise program, pain with function, poor posture  and poor body mechanics  Understanding of Dx/Px/POC: good   Prognosis: fair    Goals  ST-3 WEEKS  1  Decrease pain in right shoulder < 6/10 on VAS at its worst   2   Increase AROM in right shoulder by > 5 deg in all deficients planes  3   Increase RUE by 1/2 MMT grade in all deficient planes  LT-6 WEEKS  1  Patient to be independent with a/iadls  2  Increase functional activities for leisure and home activities to previous LOF  3  Independent with HEP and/or fitness program   4  Able to tease her hair with RUE (patient goal)       Plan  Patient would benefit from: skilled physical therapy  Planned modality interventions: cryotherapy, electrical stimulation/Russian stimulation, thermotherapy: hydrocollator packs and unattended electrical stimulation  Planned therapy interventions: activity modification, body mechanics training, flexibility, functional ROM exercises, home exercise program, IADL retraining, joint mobilization, manual therapy, neuromuscular re-education, patient education, postural training, strengthening, stretching, therapeutic activities and therapeutic exercise  Frequency: 2-3x week  Duration in weeks: 12  Plan of Care beginning date: 2020  Plan of Care expiration date: 2020  Treatment plan discussed with: patient        Subjective Evaluation    History of Present Illness  Mechanism of injury: Patient has had pain and weakness in right shoulder for years, but 2 weeks she was sick in bed and since then she has more pain and weakness in shoulder  Referred to OPT  Recurrent probem    Quality of life: excellent    Pain  Current pain ratin  At worst pain ratin  Location: right shoulder  Quality: dull ache and discomfort  Relieving factors: change in position and rest  Aggravating factors: overhead activity and lifting  Progression: no change    Social Support  Lives with: spouse    Employment status: not working  Hand dominance: right      Diagnostic Tests    FCE comments: No tests on right shoulderPatient Goals  Patient goals for therapy: decreased pain, increased motion, increased strength and independence with ADLs/IADLs  Patient goal: be able to tease her hair  Objective     Static Posture     Head  Forward  Shoulders  Rounded  Thoracic Spine  Hyperkyphosis      Neurological Testing     Sensation     Shoulder     Right Shoulder   Intact: light touch and hot/cold discrimination    Active Range of Motion   Left Shoulder   Flexion: 140 degrees   Abduction: 140 degrees     Right Shoulder   Flexion: 66 degrees   Abduction: 66 degrees     Passive Range of Motion   Left Shoulder   Flexion: 160 degrees   Abduction: 160 degrees   External rotation 90°: 90 degrees   Internal rotation 90°: WFL    Right Shoulder   Flexion: 135 degrees   Abduction: 122 degrees   External rotation 90°: 70 degrees   Internal rotation 90°: 70 degrees     Additional Passive Range of Motion Details  Crepitus noted right GH joint with FF and abd at end ranges       Strength/Myotome Testing     Left Shoulder     Planes of Motion   Flexion: 3+   Abduction: 3+   External rotation at 0°: 3+   Internal rotation at 0°: 4     Isolated Muscles   Biceps: 4     Right Shoulder     Planes of Motion   Flexion: 2   Abduction: 2   External rotation at 0°: 2+   Internal rotation at 0°: 3+     Isolated Muscles   Biceps: 4-       Flowsheet Rows      Most Recent Value   PT/OT G-Codes   Current Score  60 [right shoulder]   Projected Score  68   Assessment Type  Evaluation             Precautions: HTN, B THR  Modalities  2/27            MH/ES R shld 10                                           Manual  2/27            R shld 10                                                                    Exercise Diary  2/27            pulleys 30x            Ball closed chain FF, circles 20xea            Cane FF 20x            Cane CP 30x            CP+ 20x            shld [email or web address] 20x green                                      UBE nv            Rows nv                                                                             HEP 8'*                                                                *Patient instructed in HEP: pendulum, shrugs, rolls, pulleys

## 2020-03-02 ENCOUNTER — OFFICE VISIT (OUTPATIENT)
Dept: PHYSICAL THERAPY | Age: 75
End: 2020-03-02
Payer: MEDICARE

## 2020-03-02 DIAGNOSIS — M25.511 CHRONIC RIGHT SHOULDER PAIN: Primary | ICD-10-CM

## 2020-03-02 DIAGNOSIS — G89.29 CHRONIC RIGHT SHOULDER PAIN: Primary | ICD-10-CM

## 2020-03-02 PROCEDURE — 97014 ELECTRIC STIMULATION THERAPY: CPT | Performed by: PHYSICAL THERAPIST

## 2020-03-02 PROCEDURE — 97110 THERAPEUTIC EXERCISES: CPT | Performed by: PHYSICAL THERAPIST

## 2020-03-02 NOTE — PROGRESS NOTES
Daily Note     Today's date: 3/2/2020  Patient name: Kimber Weaver  : 1945  MRN: [id number]  Referring provider: Don Lazaro DO  Dx:   Encounter Diagnosis     ICD-10-CM    1  Chronic right shoulder pain M25 511     G89 29        Start Time: 1545  Stop Time: 1630  Total time in clinic (min): 45 minutes    Subjective: Patient reports she was not sore after last session  Patient states she did her HEP at home  Objective: See treatment diary below      Assessment: Tolerated treatment well  Patient would benefit from continued PT crepitus in [address] joint with elevation passive and active ROM  Plan: Continue per plan of care        Precautions: HTN, B THR  Modalities   3           MH/ES R shld 10 10                                          Manual  2/27 3/2           R shld 10 10                                                                   Exercise Diary  2/27 3/2           pulleys 30x 30x           Ball closed chain FF, circles 20xea 30x           Cane FF 20x 30x           Cane CP 30x 30x           CP+ 20x 30x           shld [email or web address] 20x green 30x                                     UBE nv 4'           Rows nv 30x red                                                                            HEP 8'*                                                                *Patient instructed in HEP: pendulum, shrugs, rolls, pulleys

## 2020-03-05 ENCOUNTER — OFFICE VISIT (OUTPATIENT)
Dept: PHYSICAL THERAPY | Age: 75
End: 2020-03-05
Payer: MEDICARE

## 2020-03-05 DIAGNOSIS — M25.511 CHRONIC RIGHT SHOULDER PAIN: Primary | ICD-10-CM

## 2020-03-05 DIAGNOSIS — G89.29 CHRONIC RIGHT SHOULDER PAIN: Primary | ICD-10-CM

## 2020-03-05 PROCEDURE — 97110 THERAPEUTIC EXERCISES: CPT | Performed by: PHYSICAL THERAPIST

## 2020-03-05 PROCEDURE — 97014 ELECTRIC STIMULATION THERAPY: CPT | Performed by: PHYSICAL THERAPIST

## 2020-03-05 PROCEDURE — 97140 MANUAL THERAPY 1/> REGIONS: CPT | Performed by: PHYSICAL THERAPIST

## 2020-03-05 NOTE — PROGRESS NOTES
Daily Note     Today's date: 3/5/2020  Patient name: Jazmyn Mora  : 1945  MRN: [id number]  Referring provider: Lauren Carballo DO  Dx:   Encounter Diagnosis     ICD-10-CM    1  Chronic right shoulder pain M25 511     G89 29        Start Time: 1300  Stop Time: 1350  Total time in clinic (min): 50 minutes    Subjective: Patient reports she is able to lift her arm better to do her hair and grocery shop  Objective: See treatment diary below      Assessment: Tolerated treatment well  Patient would benefit from continued PT crepitus still noted  Plan: Continue per plan of care        Precautions: HTN, B THR  Modalities  2/27 3/2 3/5          MH/ES R shld 10 10 10                                         Manual  2/27 3/2 3/5          R shld 10 10 10                                                                  Exercise Diary  2/27 3/2 3/5          pulleys 30x 30x 30x          Ball closed chain FF, circles 20xea 30x 30x          Cane FF 20x 30x 30x          Cane CP 30x 30x 30x          CP+ 20x 30x nt          shld [email or web address] 20x green 30x 30x                                    UBE nv 4' 4'          Rows nv 30x red 30x red          RTC R   15x ea          biceps   nv          tripceps   nv                                    HEP 8'*                                                                *Patient instructed in HEP: pendulum, shrugs, rolls, pulleys

## 2020-03-05 NOTE — PROGRESS NOTES
Daily Note     Today's date: 3/5/2020  Patient name: Halie Gray  : 1945  MRN: [id number]  Referring provider: Amber Wei DO  Dx:   Encounter Diagnosis     ICD-10-CM    1  Chronic right shoulder pain M25 511     G89 29        Start Time: 1300  Stop Time: 1350  Total time in clinic (min): 50 minutes    Subjective: Patient reports she is able to lift her arm a little higher to do her hair and grocery shop  Objective: See treatment diary below      Assessment: Tolerated treatment well  Patient would benefit from continued PT       Plan: Continue per plan of care        Precautions: HTN, B THR  Modalities  2/27 3/2 3/5          MH/ES R shld 10 10 10                                         Manual  2/27 3/2 3/5          R shld 10 10 10                                                                  Exercise Diary  2/27 3/2 3/5          pulleys 30x 30x 30x          Ball closed chain FF, circles 20xea 30x 30x          Cane FF 20x 30x 30x          Cane CP 30x 30x 30x          CP+ 20x 30x 30x          shld [email or web address] 20x green 30x 30x          RTC    15x red                       UBE nv 4' 4'          Rows nv 30x red 30x          bicep   nv          tricep   nv                                                 HEP 8'*                                                                *Patient instructed in HEP: pendulum, shrugs, rolls, pulleys

## 2020-03-09 ENCOUNTER — OFFICE VISIT (OUTPATIENT)
Dept: PHYSICAL THERAPY | Age: 75
End: 2020-03-09
Payer: MEDICARE

## 2020-03-09 DIAGNOSIS — M25.511 CHRONIC RIGHT SHOULDER PAIN: Primary | ICD-10-CM

## 2020-03-09 DIAGNOSIS — G89.29 CHRONIC RIGHT SHOULDER PAIN: Primary | ICD-10-CM

## 2020-03-09 PROCEDURE — 97140 MANUAL THERAPY 1/> REGIONS: CPT | Performed by: PHYSICAL THERAPIST

## 2020-03-09 PROCEDURE — 97110 THERAPEUTIC EXERCISES: CPT | Performed by: PHYSICAL THERAPIST

## 2020-03-09 PROCEDURE — 97014 ELECTRIC STIMULATION THERAPY: CPT | Performed by: PHYSICAL THERAPIST

## 2020-03-09 NOTE — PROGRESS NOTES
Daily Note     Today's date: 3/9/2020  Patient name: Adan Graf  : 1945  MRN: [id number]  Referring provider: Alicia Fischer DO  Dx:   Encounter Diagnosis     ICD-10-CM    1  Chronic right shoulder pain M25 511     G89 29        Start Time: 1445  Stop Time: 1535  Total time in clinic (min): 50 minutes    Subjective: Patient reports she is doing ok with right shoulder  Using it as she is able  Objective: See treatment diary below      Assessment: Tolerated treatment well  Patient would benefit from continued PT crepitus right SH joint  PROM improving  Weak with ER  Plan: Continue per plan of care        Precautions: HTN, B THR  Modalities  2/27 3/2 3/5 3/9         MH/ES R shld 10 10 10 10                                        Manual  2/27 3/2 3/5 3/9         R shld 10 10 10 10                                                                 Exercise Diary  2/27 3/2 3/5 3/9         pulleys 30x 30x 30x 30x         Ball closed chain FF, circles 20xea 30x 30x 30x         Cane FF 20x 30x 30x 30x         Cane CP 30x 30x 30x 30x         CP+ 20x 30x nt          shld [employer] 20x green 30x 30x 30x 2#                                   UBE nv 4' 4' 4x         Rows nv 30x red 30x red 30x blue         RTC R   15x ea 30x         biceps   nv 30# 30x         tripceps   nv 30# 30x                                   HEP 8'*                                                                *Patient instructed in HEP: pendulum, shrugs, rolls, pulleys

## 2020-03-12 ENCOUNTER — APPOINTMENT (OUTPATIENT)
Dept: PHYSICAL THERAPY | Age: 75
End: 2020-03-12
Payer: MEDICARE

## 2020-03-13 ENCOUNTER — APPOINTMENT (OUTPATIENT)
Dept: LAB | Facility: CLINIC | Age: 75
End: 2020-03-13
Payer: MEDICARE

## 2020-03-13 DIAGNOSIS — E78.2 MIXED HYPERLIPIDEMIA: ICD-10-CM

## 2020-03-13 DIAGNOSIS — I10 ESSENTIAL HYPERTENSION: ICD-10-CM

## 2020-03-13 LAB
ALBUMIN SERPL BCP-MCNC: 3.5 G/DL (ref 3.5–5)
ALP SERPL-CCNC: 64 U/L (ref 46–116)
ALT SERPL W P-5'-P-CCNC: 30 U/L (ref 12–78)
ANION GAP SERPL CALCULATED.3IONS-SCNC: 5 MMOL/L (ref 4–13)
AST SERPL W P-5'-P-CCNC: 21 U/L (ref 5–45)
BASOPHILS # BLD AUTO: 0.05 THOUSANDS/ΜL (ref 0–0.1)
BASOPHILS NFR BLD AUTO: 1 % (ref 0–1)
BILIRUB SERPL-MCNC: 0.42 MG/DL (ref 0.2–1)
BUN SERPL-MCNC: 32 MG/DL (ref 5–25)
CALCIUM SERPL-MCNC: 9.7 MG/DL (ref 8.3–10.1)
CHLORIDE SERPL-SCNC: 104 MMOL/L (ref 100–108)
CHOLEST SERPL-MCNC: 214 MG/DL (ref 50–200)
CO2 SERPL-SCNC: 30 MMOL/L (ref 21–32)
CREAT SERPL-MCNC: 1.07 MG/DL (ref 0.6–1.3)
EOSINOPHIL # BLD AUTO: 0.04 THOUSAND/ΜL (ref 0–0.61)
EOSINOPHIL NFR BLD AUTO: 1 % (ref 0–6)
ERYTHROCYTE [DISTWIDTH] IN BLOOD BY AUTOMATED COUNT: 14.6 % (ref 11.6–15.1)
GFR SERPL CREATININE-BSD FRML MDRD: 50 ML/MIN/1.73SQ M
GLUCOSE P FAST SERPL-MCNC: 80 MG/DL (ref 65–99)
HCT VFR BLD AUTO: 42.9 % (ref 34.8–46.1)
HDLC SERPL-MCNC: 72 MG/DL
HGB BLD-MCNC: 13.3 G/DL (ref 11.5–15.4)
IMM GRANULOCYTES # BLD AUTO: 0.02 THOUSAND/UL (ref 0–0.2)
IMM GRANULOCYTES NFR BLD AUTO: 0 % (ref 0–2)
LDLC SERPL CALC-MCNC: 125 MG/DL (ref 0–100)
LYMPHOCYTES # BLD AUTO: 2.13 THOUSANDS/ΜL (ref 0.6–4.47)
LYMPHOCYTES NFR BLD AUTO: 31 % (ref 14–44)
MCH RBC QN AUTO: 29.8 PG (ref 26.8–34.3)
MCHC RBC AUTO-ENTMCNC: 31 G/DL (ref 31.4–37.4)
MCV RBC AUTO: 96 FL (ref 82–98)
MONOCYTES # BLD AUTO: 0.77 THOUSAND/ΜL (ref 0.17–1.22)
MONOCYTES NFR BLD AUTO: 11 % (ref 4–12)
NEUTROPHILS # BLD AUTO: 3.95 THOUSANDS/ΜL (ref 1.85–7.62)
NEUTS SEG NFR BLD AUTO: 56 % (ref 43–75)
NONHDLC SERPL-MCNC: 142 MG/DL
NRBC BLD AUTO-RTO: 0 /100 WBCS
PLATELET # BLD AUTO: 304 THOUSANDS/UL (ref 149–390)
PMV BLD AUTO: 9.9 FL (ref 8.9–12.7)
POTASSIUM SERPL-SCNC: 3.8 MMOL/L (ref 3.5–5.3)
PROT SERPL-MCNC: 7.2 G/DL (ref 6.4–8.2)
RBC # BLD AUTO: 4.46 MILLION/UL (ref 3.81–5.12)
SODIUM SERPL-SCNC: 139 MMOL/L (ref 136–145)
TRIGL SERPL-MCNC: 84 MG/DL
TSH SERPL DL<=0.05 MIU/L-ACNC: 1.75 UIU/ML (ref 0.36–3.74)
WBC # BLD AUTO: 6.96 THOUSAND/UL (ref 4.31–10.16)

## 2020-03-13 PROCEDURE — 85025 COMPLETE CBC W/AUTO DIFF WBC: CPT

## 2020-03-13 PROCEDURE — 84443 ASSAY THYROID STIM HORMONE: CPT

## 2020-03-13 PROCEDURE — 80061 LIPID PANEL: CPT

## 2020-03-13 PROCEDURE — 36415 COLL VENOUS BLD VENIPUNCTURE: CPT

## 2020-03-13 PROCEDURE — 80053 COMPREHEN METABOLIC PANEL: CPT

## 2020-03-16 ENCOUNTER — APPOINTMENT (OUTPATIENT)
Dept: PHYSICAL THERAPY | Age: 75
End: 2020-03-16
Payer: MEDICARE

## 2020-03-27 DIAGNOSIS — R60.9 EDEMA, UNSPECIFIED TYPE: ICD-10-CM

## 2020-03-27 DIAGNOSIS — I10 ESSENTIAL HYPERTENSION: Primary | ICD-10-CM

## 2020-03-27 DIAGNOSIS — I10 ESSENTIAL HYPERTENSION: ICD-10-CM

## 2020-03-27 DIAGNOSIS — E78.2 MIXED HYPERLIPIDEMIA: ICD-10-CM

## 2020-03-27 RX ORDER — METOPROLOL TARTRATE 100 MG/1
100 TABLET ORAL EVERY 12 HOURS SCHEDULED
Qty: 180 TABLET | Refills: 3 | Status: SHIPPED | OUTPATIENT
Start: 2020-03-27 | End: 2021-05-03

## 2020-03-27 RX ORDER — FUROSEMIDE 40 MG/1
40 TABLET ORAL DAILY
Qty: 90 TABLET | Refills: 3 | Status: SHIPPED | OUTPATIENT
Start: 2020-03-27 | End: 2021-05-03

## 2020-03-27 RX ORDER — TELMISARTAN 80 MG/1
80 TABLET ORAL DAILY
Qty: 90 TABLET | Refills: 2 | Status: SHIPPED | OUTPATIENT
Start: 2020-03-27 | End: 2021-02-09 | Stop reason: SDUPTHER

## 2020-03-27 NOTE — TELEPHONE ENCOUNTER
Call this patient notify her that I sent in refills for all of her medications and she will be due for an appointment in June or July for her annual Medicare wellness visit I sent in a prescription for all lab work to be done 1 week before that visit [facility] she does not need to  anything here

## 2020-03-30 NOTE — PROGRESS NOTES
Patient called to state she is not coming due to [place] and will follow up with her MD when able to get out of the house   D/c PT at this time at patient request

## 2020-07-06 ENCOUNTER — TRANSCRIBE ORDERS (OUTPATIENT)
Dept: ADMINISTRATIVE | Facility: HOSPITAL | Age: 75
End: 2020-07-06

## 2020-07-06 DIAGNOSIS — M76.821 POSTERIOR TIBIAL TENDINITIS OF RIGHT LEG: Primary | ICD-10-CM

## 2020-07-09 ENCOUNTER — HOSPITAL ENCOUNTER (OUTPATIENT)
Dept: MRI IMAGING | Facility: HOSPITAL | Age: 75
Discharge: HOME | End: 2020-07-09
Attending: PODIATRIST
Payer: MEDICARE

## 2020-07-09 DIAGNOSIS — M76.821 POSTERIOR TIBIAL TENDINITIS OF RIGHT LEG: ICD-10-CM

## 2020-07-09 PROCEDURE — 73718 MRI LOWER EXTREMITY W/O DYE: CPT

## 2020-07-15 ENCOUNTER — OFFICE VISIT (OUTPATIENT)
Dept: FAMILY MEDICINE CLINIC | Facility: CLINIC | Age: 75
End: 2020-07-15
Payer: MEDICARE

## 2020-07-15 VITALS
SYSTOLIC BLOOD PRESSURE: 132 MMHG | OXYGEN SATURATION: 97 % | BODY MASS INDEX: 44.57 KG/M2 | WEIGHT: 227 LBS | DIASTOLIC BLOOD PRESSURE: 78 MMHG | TEMPERATURE: 98.9 F | HEIGHT: 60 IN | HEART RATE: 78 BPM

## 2020-07-15 DIAGNOSIS — K58.2 IRRITABLE BOWEL SYNDROME WITH BOTH CONSTIPATION AND DIARRHEA: Primary | ICD-10-CM

## 2020-07-15 DIAGNOSIS — E78.2 MIXED HYPERLIPIDEMIA: ICD-10-CM

## 2020-07-15 DIAGNOSIS — M10.9 ACUTE GOUT OF LEFT FOOT, UNSPECIFIED CAUSE: ICD-10-CM

## 2020-07-15 DIAGNOSIS — I10 ESSENTIAL HYPERTENSION: ICD-10-CM

## 2020-07-15 DIAGNOSIS — Z00.00 MEDICARE ANNUAL WELLNESS VISIT, SUBSEQUENT: ICD-10-CM

## 2020-07-15 DIAGNOSIS — M15.9 OSTEOARTHRITIS, GENERALIZED: ICD-10-CM

## 2020-07-15 PROCEDURE — 1036F TOBACCO NON-USER: CPT | Performed by: FAMILY MEDICINE

## 2020-07-15 PROCEDURE — 1123F ACP DISCUSS/DSCN MKR DOCD: CPT | Performed by: FAMILY MEDICINE

## 2020-07-15 PROCEDURE — 1125F AMNT PAIN NOTED PAIN PRSNT: CPT | Performed by: FAMILY MEDICINE

## 2020-07-15 PROCEDURE — 3008F BODY MASS INDEX DOCD: CPT | Performed by: FAMILY MEDICINE

## 2020-07-15 PROCEDURE — 1170F FXNL STATUS ASSESSED: CPT | Performed by: FAMILY MEDICINE

## 2020-07-15 PROCEDURE — G0438 PPPS, INITIAL VISIT: HCPCS | Performed by: FAMILY MEDICINE

## 2020-07-15 PROCEDURE — 99214 OFFICE O/P EST MOD 30 MIN: CPT | Performed by: FAMILY MEDICINE

## 2020-07-15 PROCEDURE — 1160F RVW MEDS BY RX/DR IN RCRD: CPT | Performed by: FAMILY MEDICINE

## 2020-07-15 PROCEDURE — 3075F SYST BP GE 130 - 139MM HG: CPT | Performed by: FAMILY MEDICINE

## 2020-07-15 PROCEDURE — 3078F DIAST BP <80 MM HG: CPT | Performed by: FAMILY MEDICINE

## 2020-07-15 RX ORDER — GABAPENTIN 100 MG/1
200 CAPSULE ORAL 2 TIMES DAILY
COMMUNITY
Start: 2020-07-01

## 2020-07-15 NOTE — ASSESSMENT & PLAN NOTE
Mixed hyperlipidemia continue with low saturated fats in the diet heart healthy diet information lipid profile CMP every 4-6 months

## 2020-07-15 NOTE — ASSESSMENT & PLAN NOTE
Hypertension stable continue with current medications as prescribed and maintain low-sodium diet exercise regularly work on weight reduction and follow-up with me as scheduled

## 2020-07-15 NOTE — ASSESSMENT & PLAN NOTE
Recent swelling and pain in the left foot seen by podiatry given an injection of cortisone with relief and placed on prednisone for now told that she has gout uric acid level was done 2 years ago with level at 6 7 will repeat the uric acid now

## 2020-07-15 NOTE — PROGRESS NOTES
Assessment and Plan:     Problem List Items Addressed This Visit     None           Preventive health issues were discussed with patient, and age appropriate screening tests were ordered as noted in patient's After Visit Summary  Personalized health advice and appropriate referrals for health education or preventive services given if needed, as noted in patient's After Visit Summary  History of Present Illness:     Patient presents for Welcome to Medicare visit       Patient Care Team:  Norm Garcia DO as PCP - General (Family Medicine)     Review of Systems:     Review of Systems   Problem List:     Patient Active Problem List   Diagnosis    Generalized anxiety disorder    Essential hypertension    Osteoarthritis, generalized    Irritable bowel syndrome with both constipation and diarrhea    Mixed hyperlipidemia    Ankle edema, bilateral    Chronic right shoulder pain      Past Medical and Surgical History:     Past Medical History:   Diagnosis Date    Hyperlipidemia     Hypertension      Past Surgical History:   Procedure Laterality Date    JOINT REPLACEMENT        Family History:     Family History   Problem Relation Age of Onset    No Known Problems Mother     No Known Problems Sister     No Known Problems Daughter     No Known Problems Daughter     No Known Problems Daughter     No Known Problems Maternal Aunt     No Known Problems Maternal Aunt     Cancer Paternal Aunt       Social History:        Social History     Socioeconomic History    Marital status: /Civil Union     Spouse name: Not on file    Number of children: Not on file    Years of education: Not on file    Highest education level: Not on file   Occupational History    Not on file   Social Needs    Financial resource strain: Not on file    Food insecurity:     Worry: Not on file     Inability: Not on file    Transportation needs:     Medical: Not on file     Non-medical: Not on file   Tobacco Use    Smoking status: Former Smoker    Smokeless tobacco: Never Used   Substance and Sexual Activity    Alcohol use: Not Currently    Drug use: Never    Sexual activity: Not on file   Lifestyle    Physical activity:     Days per week: Not on file     Minutes per session: Not on file    Stress: Not on file   Relationships    Social connections:     Talks on phone: Not on file     Gets together: Not on file     Attends [employer] witness service: Not on file     Active member of club or organization: Not on file     Attends meetings of clubs or organizations: Not on file     Relationship status: Not on file    Intimate partner violence:     Fear of current or ex partner: Not on file     Emotionally abused: Not on file     Physically abused: Not on file     Forced sexual activity: Not on file   Other Topics Concern    Not on file   Social History Narrative    Not on file      Medications and Allergies:     Current Outpatient Medications   Medication Sig Dispense Refill    aspirin (ECOTRIN LOW STRENGTH) 81 mg EC tablet Take 81 mg by mouth daily      Calcium Carb-Cholecalciferol (CALCIUM 600+D3 PO) Take by mouth      cyanocobalamin (VITAMIN B-12) 1,000 mcg tablet Take by mouth daily      furosemide (LASIX) 40 mg tablet Take 1 tablet (40 mg total) by mouth daily 90 tablet 3    metoprolol tartrate (LOPRESSOR) 100 mg tablet Take 1 tablet (100 mg total) by mouth every 12 (twelve) hours 180 tablet 3    Multiple Vitamins-Minerals (MULTIVITAMIN WITH MINERALS) tablet Take 1 tablet by mouth daily      telmisartan (MICARDIS) 80 MG tablet Take 1 tablet (80 mg total) by mouth daily 90 tablet 2     No current facility-administered medications for this visit  Allergies   Allergen Reactions    Altace [Ramipril]       Immunizations: There is no immunization history on file for this patient  Health Maintenance: There are no preventive care reminders to display for this patient        Topic Date Due    DTaP,Tdap,and Td Vaccines (1 - Tdap) 1954    Pneumococcal Vaccine: 65+ Years (1 of 2 - PCV13) 2008    Influenza Vaccine  2020      Medicare Screening Tests and Risk Assessments:     Annual Wellness Visit  No exam data present     Physical Exam:     There were no vitals taken for this visit  Physical Exam    Burtis Duverney, DOBMI Counseling: There is no height or weight on file to calculate BMI   The BMI {VB BMI Counselin}

## 2020-07-15 NOTE — ASSESSMENT & PLAN NOTE
Irritable bowel syndrome with both constipation and diarrhea has been stable with fiber recommend Metamucil off and on intermittently as needed and fruits and vegetables to maintain stable bowels

## 2020-07-15 NOTE — PATIENT INSTRUCTIONS
Gout   WHAT YOU NEED TO KNOW:   Gout is a form of arthritis that causes severe joint pain, redness, swelling, and stiffness  Acute gout pain starts suddenly, gets worse quickly, and stops on its own  Acute gout can become chronic and cause permanent damage to the joints  DISCHARGE INSTRUCTIONS:   Return to the emergency department if:   · You have severe pain in one or more of your joints that you cannot tolerate  · You have a fever or redness that spreads beyond the joint area  Contact your healthcare provider if:   · You have new symptoms, such as a rash, after you start gout treatment  · Your joint pain and swelling do not go away, even after treatment  · You are not urinating as much or as often as you usually do  · You have trouble taking your gout medicines  · You have questions or concerns about your condition or care  Medicines: You may need any of the following:  · Prescription pain medicine  may be given  Ask your healthcare provider how to take this medicine safely  Some prescription pain medicines contain acetaminophen  Do not take other medicines that contain acetaminophen without talking to your healthcare provider  Too much acetaminophen may cause liver damage  Prescription pain medicine may cause constipation  Ask your healthcare provider how to prevent or treat constipation  · NSAIDs , such as ibuprofen, help decrease swelling, pain, and fever  This medicine is available with or without a doctor's order  NSAIDs can cause stomach bleeding or kidney problems in certain people  If you take blood thinner medicine, always ask your healthcare provider if NSAIDs are safe for you  Always read the medicine label and follow directions  · Gout medicine  decreases joint pain and swelling  It may also be given to prevent new gout attacks  · Steroids  reduce inflammation and can help your joint stiffness and pain during gout attacks      · Uric acid medicine  may be given to reduce the amount of uric acid your body makes  Some medicines may help you pass more uric acid when you urinate  · Take your medicine as directed  Contact your healthcare provider if you think your medicine is not helping or if you have side effects  Tell him or her if you are allergic to any medicine  Keep a list of the medicines, vitamins, and herbs you take  Include the amounts, and when and why you take them  Bring the list or the pill bottles to follow-up visits  Carry your medicine list with you in case of an emergency  Follow up with your healthcare provider as directed:  Write down your questions so you remember to ask them during your visits  Manage gout:   · Rest your painful joint so it can heal   Your healthcare provider may recommend crutches or a walker if the affected joint is in a leg  · Apply ice to your joint  Ice decreases pain and swelling  Use an ice pack, or put crushed ice in a plastic bag  Cover the ice pack or bag with a towel before you apply it to your painful joint  Apply ice for 15 to 20 minutes every hour, or as directed  · Elevate your joint  Elevation helps reduce swelling and pain  Raise your joint above the level of your heart as often as you can  Prop your painful joint on pillows to keep it above your heart comfortably  · Go to physical therapy if directed  A physical therapist can teach you exercises to improve flexibility and range of motion  Prevent gout attacks:   · Do not eat high-purine foods  These foods include meats, seafood, asparagus, spinach, cauliflower, and some types of beans  Healthcare providers may tell you to eat more low-fat milk products, such as yogurt  Milk products may decrease your risk for gout attacks  Vitamin C and coffee may also help  Your healthcare provider or dietitian can help you create a meal plan  · Drink more liquids as directed  Liquids such as water help remove uric acid from your body   Ask how much liquid to drink each day and which liquids are best for you  · Manage your weight  Weight loss may decrease the amount of uric acid in your body  Exercise can help you lose weight  Talk to your healthcare provider about the best exercises for you  · Control your blood sugar level if you have diabetes  Keep your blood sugar level in a normal range  This can help prevent gout attacks  · Limit or do not drink alcohol as directed  Alcohol can trigger a gout attack  Alcohol also increases your risk for dehydration  Ask your healthcare provider if alcohol is safe for you  © 2017 [facility] Information is for End User's use only and may not be sold, redistributed or otherwise used for commercial purposes  All illustrations and images included in CareNotes® are the copyrighted property of A D A M , Inc  or Samuel Huffman  The above information is an  only  It is not intended as medical advice for individual conditions or treatments  Talk to your doctor, nurse or pharmacist before following any medical regimen to see if it is safe and effective for you

## 2020-07-15 NOTE — PROGRESS NOTES
Assessment/Plan:       Problem List Items Addressed This Visit        Digestive    Irritable bowel syndrome with both constipation and diarrhea - Primary     Irritable bowel syndrome with both constipation and diarrhea has been stable with fiber recommend Metamucil off and on intermittently as needed and fruits and vegetables to maintain stable bowels         Relevant Orders    Comprehensive metabolic panel    Lipid panel    TSH, 3rd generation with Free T4 reflex    Uric acid       Cardiovascular and Mediastinum    Essential hypertension      Hypertension stable continue with current medications as prescribed and maintain low-sodium diet exercise regularly work on weight reduction and follow-up with me as scheduled         Relevant Orders    Comprehensive metabolic panel    Lipid panel    TSH, 3rd generation with Free T4 reflex    Uric acid       Musculoskeletal and Integument    Osteoarthritis, generalized      Generalized osteoarthritis stable work on weight reduction and continuation of exercise and weight-bearing exercise continue with calcium and vitamin-D         Relevant Orders    Comprehensive metabolic panel    Lipid panel    TSH, 3rd generation with Free T4 reflex    Uric acid    Acute gout of left foot     Recent swelling and pain in the left foot seen by podiatry given an injection of cortisone with relief and placed on prednisone for now told that she has gout uric acid level was done 2 years ago with level at 6 7 will repeat the uric acid now         Relevant Orders    Comprehensive metabolic panel    Lipid panel    TSH, 3rd generation with Free T4 reflex    Uric acid       Other    Mixed hyperlipidemia      Mixed hyperlipidemia continue with low saturated fats in the diet heart healthy diet information lipid profile CMP every 4-6 months         Relevant Orders    Comprehensive metabolic panel    Lipid panel    TSH, 3rd generation with Free T4 reflex    Uric acid    Medicare annual wellness visit, subsequent    Relevant Orders    Comprehensive metabolic panel    Lipid panel    TSH, 3rd generation with Free T4 reflex    Uric acid            Subjective:      Patient ID: Aaliyah Camarena is a 66 y o  female  Patient presents for annual Medicare wellness visit and discussion regarding her laboratory work and medication refills and renewals      The following portions of the patient's history were reviewed and updated as appropriate: allergies, current medications, past family history, past medical history, past social history, past surgical history and problem list     Review of Systems   Constitutional: Negative for chills, fatigue and fever  HENT: Negative for congestion, nosebleeds, rhinorrhea, sinus pressure and sore throat  Eyes: Negative for discharge and redness  Respiratory: Negative for cough and shortness of breath  Cardiovascular: Negative for chest pain, palpitations and leg swelling  Gastrointestinal: Negative for abdominal pain, blood in stool and nausea  Endocrine: Negative for cold intolerance, heat intolerance and polyuria  Genitourinary: Negative for dysuria and frequency  Musculoskeletal: Negative for arthralgias, back pain and myalgias  Skin: Negative for rash  Neurological: Negative for dizziness, weakness and headaches  Hematological: Negative for adenopathy  Psychiatric/Behavioral: Negative for behavioral problems and sleep disturbance  The patient is not nervous/anxious  Objective:      /78 (BP Location: Left arm, Patient Position: Sitting)   Pulse 78   Temp 98 9 °F (37 2 °C)   Ht 5' (1 524 m)   Wt 103 kg (227 lb)   SpO2 97%   BMI 44 33 kg/m²        Physical Exam   Constitutional: She is oriented to person, place, and time  She appears well-developed and well-nourished  No distress  HENT:   Head: Normocephalic and atraumatic     Right Ear: External ear normal    Left Ear: External ear normal    Nose: Nose normal    Mouth/Throat: Oropharynx is clear and moist  No oropharyngeal exudate  Eyes: Pupils are equal, round, and reactive to light  Conjunctivae and EOM are normal  Right eye exhibits no discharge  Left eye exhibits no discharge  No scleral icterus  Neck: Normal range of motion  No JVD present  No thyromegaly present  Cardiovascular: Normal rate, regular rhythm and normal heart sounds  No murmur heard  Pulmonary/Chest: Effort normal  She has no wheezes  She has no rales  She exhibits no tenderness  Abdominal: Soft  Bowel sounds are normal  She exhibits no distension and no mass  There is no tenderness  Musculoskeletal: Normal range of motion  She exhibits edema  She exhibits no tenderness or deformity  Lymphadenopathy:     She has no cervical adenopathy  Neurological: She is alert and oriented to person, place, and time  She has normal reflexes  She displays normal reflexes  No cranial nerve deficit  Coordination normal    Skin: Skin is warm and dry  No rash noted  Psychiatric: She has a normal mood and affect  Her behavior is normal  Judgment and thought content normal    Nursing note and vitals reviewed  Data:    Laboratory Results: I have personally reviewed the pertinent laboratory results/reports   Radiology/Other Diagnostic Testing Results: I have personally reviewed pertinent reports         Lab Results   Component Value Date    WBC 6 96 03/13/2020    HGB 13 3 03/13/2020    HCT 42 9 03/13/2020    MCV 96 03/13/2020     03/13/2020     Lab Results   Component Value Date     04/16/2018    K 3 8 03/13/2020     03/13/2020    CO2 30 03/13/2020    ANIONGAP 11 9 04/16/2018    BUN 32 (H) 03/13/2020    CREATININE 1 07 03/13/2020    GLUF 80 03/13/2020    CALCIUM 9 7 03/13/2020    AST 21 03/13/2020    ALT 30 03/13/2020    ALKPHOS 64 03/13/2020    PROT 6 3 (L) 04/16/2018    BILITOT 0 4 04/16/2018    EGFR 50 03/13/2020     Lab Results   Component Value Date    CHOLESTEROL 214 (H) 03/13/2020    CHOLESTEROL 196 02/27/2019     Lab Results   Component Value Date    HDL 72 03/13/2020    HDL 74 (H) 02/27/2019    HDL 67 (H) 04/16/2018     Lab Results   Component Value Date    LDLCALC 125 (H) 03/13/2020    LDLCALC 107 (H) 02/27/2019    LDLCALC 83 1 04/16/2018     Lab Results   Component Value Date    TRIG 84 03/13/2020    TRIG 74 02/27/2019    TRIG 82 04/16/2018     No results found for: [place], [place]  Lab Results   Component Value Date    MZW1WJBOBPPD 1 750 03/13/2020     No results found for: HGBA1C  No results found for: KEESHA Ambrocio, DO

## 2020-07-15 NOTE — ASSESSMENT & PLAN NOTE
Generalized osteoarthritis stable work on weight reduction and continuation of exercise and weight-bearing exercise continue with calcium and vitamin-D

## 2020-07-15 NOTE — PROGRESS NOTES
BMI Counseling: Body mass index is 44 33 kg/m²  The BMI is above normal  Nutrition recommendations include reducing portion sizes, decreasing overall calorie intake, 3-5 servings of fruits/vegetables daily and moderation in carbohydrate intake  Exercise recommendations include exercising 3-5 times per week  Assessment and Plan:     Problem List Items Addressed This Visit     None           Preventive health issues were discussed with patient, and age appropriate screening tests were ordered as noted in patient's After Visit Summary  Personalized health advice and appropriate referrals for health education or preventive services given if needed, as noted in patient's After Visit Summary       History of Present Illness:     Patient presents for Medicare Annual Wellness visit    Patient Care Team:  Cherelle Henson DO as PCP - General (Family Medicine)     Problem List:     Patient Active Problem List   Diagnosis    Generalized anxiety disorder    Essential hypertension    Osteoarthritis, generalized    Irritable bowel syndrome with both constipation and diarrhea    Mixed hyperlipidemia    Ankle edema, bilateral    Chronic right shoulder pain      Past Medical and Surgical History:     Past Medical History:   Diagnosis Date    Hyperlipidemia     Hypertension      Past Surgical History:   Procedure Laterality Date    JOINT REPLACEMENT        Family History:     Family History   Problem Relation Age of Onset    No Known Problems Mother     No Known Problems Sister     No Known Problems Daughter     No Known Problems Daughter     No Known Problems Daughter     No Known Problems Maternal Aunt     No Known Problems Maternal Aunt     Cancer Paternal Aunt       Social History:        Social History     Socioeconomic History    Marital status: /Civil Union     Spouse name: None    Number of children: None    Years of education: None    Highest education level: None   Occupational History    None Social Needs    Financial resource strain: None    Food insecurity:     Worry: None     Inability: None    Transportation needs:     Medical: None     Non-medical: None   Tobacco Use    Smoking status: Former Smoker    Smokeless tobacco: Never Used   Substance and Sexual Activity    Alcohol use: Not Currently    Drug use: Never    Sexual activity: None   Lifestyle    Physical activity:     Days per week: None     Minutes per session: None    Stress: None   Relationships    Social connections:     Talks on phone: None     Gets together: None     Attends Faith service: None     Active member of club or organization: None     Attends meetings of clubs or organizations: None     Relationship status: None    Intimate partner violence:     Fear of current or ex partner: None     Emotionally abused: None     Physically abused: None     Forced sexual activity: None   Other Topics Concern    None   Social History Narrative    None      Medications and Allergies:     Current Outpatient Medications   Medication Sig Dispense Refill    aspirin (ECOTRIN LOW STRENGTH) 81 mg EC tablet Take 81 mg by mouth daily      Calcium Carb-Cholecalciferol (CALCIUM 600+D3 PO) Take by mouth      cyanocobalamin (VITAMIN B-12) 1,000 mcg tablet Take by mouth daily      furosemide (LASIX) 40 mg tablet Take 1 tablet (40 mg total) by mouth daily 90 tablet 3    gabapentin (NEURONTIN) 100 mg capsule 200 mg 2 (two) times a day      metoprolol tartrate (LOPRESSOR) 100 mg tablet Take 1 tablet (100 mg total) by mouth every 12 (twelve) hours 180 tablet 3    Multiple Vitamins-Minerals (MULTIVITAMIN WITH MINERALS) tablet Take 1 tablet by mouth daily      telmisartan (MICARDIS) 80 MG tablet Take 1 tablet (80 mg total) by mouth daily 90 tablet 2     No current facility-administered medications for this visit  Allergies   Allergen Reactions    Altace [Ramipril]       Immunizations:        There is no immunization history on file for this patient  Health Maintenance: There are no preventive care reminders to display for this patient  Topic Date Due    DTaP,Tdap,and Td Vaccines (1 - Tdap) 01/16/1956    Pneumococcal Vaccine: 65+ Years (1 of 2 - PCV13) 01/16/2008    Influenza Vaccine  07/01/2020      Medicare Health Risk Assessment:     Temp 98 9 °F (37 2 °C)   Ht 5' (1 524 m)   Wt 103 kg (227 lb)   BMI 44 33 kg/m²      Richard Morrow is here for her Subsequent Wellness visit  Health Risk Assessment:   Patient rates overall health as very good  Patient feels that their physical health rating is same  Eyesight was rated as same  Hearing was rated as same  Patient feels that their emotional and mental health rating is same  Pain experienced in the last 7 days has been some  Patient's pain rating has been 9/10  Depression Screening:   PHQ-2 Score: 0      Fall Risk Screening: In the past year, patient has experienced: no history of falling in past year      Urinary Incontinence Screening:   Patient has not leaked urine accidently in the last six months  Home Safety:  Patient has trouble with stairs inside or outside of their home  Patient has working smoke alarms and has working carbon monoxide detector  Home safety hazards include: none  Nutrition:   Current diet is Frequent junk food  Medications:   Patient is currently taking over-the-counter supplements  OTC medications include: see medication list  Patient is able to manage medications  Activities of Daily Living (ADLs)/Instrumental Activities of Daily Living (IADLs):   Walk and transfer into and out of bed and chair?: Yes  Dress and groom yourself?: Yes    Bathe or shower yourself?: Yes    Feed yourself?  Yes  Do your laundry/housekeeping?: Yes  Manage your money, pay your bills and track your expenses?: Yes  Make your own meals?: Yes    Do your own shopping?: Yes    Previous Hospitalizations:   Any hospitalizations or ED visits within the last 12 months?: No      Advance Care Planning:   Living will: No    Durable POA for healthcare:  Yes    Advanced directive: No      PREVENTIVE SCREENINGS      Cardiovascular Screening:    General: Screening Not Indicated and History Lipid Disorder      Diabetes Screening:     General: Screening Current      Breast Cancer Screening:     General: Screening Current      Cervical Cancer Screening:    General: Screening Not Indicated      Osteoporosis Screening:    General: Risks and Benefits Discussed and Screening Current      Abdominal Aortic Aneurysm (AAA) Screening:        General: Risks and Benefits Discussed      Lung Cancer Screening:     General: Screening Not Indicated      Rhonda Lr DO

## 2020-07-22 ENCOUNTER — APPOINTMENT (OUTPATIENT)
Dept: LAB | Facility: CLINIC | Age: 75
End: 2020-07-22
Payer: MEDICARE

## 2020-07-22 DIAGNOSIS — M10.9 ACUTE GOUT OF LEFT FOOT, UNSPECIFIED CAUSE: ICD-10-CM

## 2020-07-22 DIAGNOSIS — E78.2 MIXED HYPERLIPIDEMIA: ICD-10-CM

## 2020-07-22 DIAGNOSIS — I10 ESSENTIAL HYPERTENSION: ICD-10-CM

## 2020-07-22 DIAGNOSIS — Z00.00 MEDICARE ANNUAL WELLNESS VISIT, SUBSEQUENT: ICD-10-CM

## 2020-07-22 DIAGNOSIS — K58.2 IRRITABLE BOWEL SYNDROME WITH BOTH CONSTIPATION AND DIARRHEA: ICD-10-CM

## 2020-07-22 DIAGNOSIS — M15.9 OSTEOARTHRITIS, GENERALIZED: ICD-10-CM

## 2020-07-22 LAB
ALBUMIN SERPL BCP-MCNC: 3.1 G/DL (ref 3.5–5)
ALP SERPL-CCNC: 61 U/L (ref 46–116)
ALT SERPL W P-5'-P-CCNC: 24 U/L (ref 12–78)
ANION GAP SERPL CALCULATED.3IONS-SCNC: 5 MMOL/L (ref 4–13)
AST SERPL W P-5'-P-CCNC: 18 U/L (ref 5–45)
BASOPHILS # BLD AUTO: 0.06 THOUSANDS/ΜL (ref 0–0.1)
BASOPHILS NFR BLD AUTO: 1 % (ref 0–1)
BILIRUB SERPL-MCNC: 0.62 MG/DL (ref 0.2–1)
BUN SERPL-MCNC: 33 MG/DL (ref 5–25)
CALCIUM SERPL-MCNC: 9.8 MG/DL (ref 8.3–10.1)
CHLORIDE SERPL-SCNC: 107 MMOL/L (ref 100–108)
CHOLEST SERPL-MCNC: 172 MG/DL (ref 50–200)
CO2 SERPL-SCNC: 29 MMOL/L (ref 21–32)
CREAT SERPL-MCNC: 0.97 MG/DL (ref 0.6–1.3)
EOSINOPHIL # BLD AUTO: 0.02 THOUSAND/ΜL (ref 0–0.61)
EOSINOPHIL NFR BLD AUTO: 0 % (ref 0–6)
ERYTHROCYTE [DISTWIDTH] IN BLOOD BY AUTOMATED COUNT: 14.6 % (ref 11.6–15.1)
GFR SERPL CREATININE-BSD FRML MDRD: 57 ML/MIN/1.73SQ M
GLUCOSE P FAST SERPL-MCNC: 72 MG/DL (ref 65–99)
HCT VFR BLD AUTO: 41 % (ref 34.8–46.1)
HDLC SERPL-MCNC: 55 MG/DL
HGB BLD-MCNC: 13.1 G/DL (ref 11.5–15.4)
IMM GRANULOCYTES # BLD AUTO: 0.23 THOUSAND/UL (ref 0–0.2)
IMM GRANULOCYTES NFR BLD AUTO: 2 % (ref 0–2)
LDLC SERPL CALC-MCNC: 95 MG/DL (ref 0–100)
LYMPHOCYTES # BLD AUTO: 2.63 THOUSANDS/ΜL (ref 0.6–4.47)
LYMPHOCYTES NFR BLD AUTO: 24 % (ref 14–44)
MCH RBC QN AUTO: 31 PG (ref 26.8–34.3)
MCHC RBC AUTO-ENTMCNC: 32 G/DL (ref 31.4–37.4)
MCV RBC AUTO: 97 FL (ref 82–98)
MONOCYTES # BLD AUTO: 1.1 THOUSAND/ΜL (ref 0.17–1.22)
MONOCYTES NFR BLD AUTO: 10 % (ref 4–12)
NEUTROPHILS # BLD AUTO: 7.17 THOUSANDS/ΜL (ref 1.85–7.62)
NEUTS SEG NFR BLD AUTO: 63 % (ref 43–75)
NONHDLC SERPL-MCNC: 117 MG/DL
NRBC BLD AUTO-RTO: 0 /100 WBCS
PLATELET # BLD AUTO: 293 THOUSANDS/UL (ref 149–390)
PMV BLD AUTO: 9.7 FL (ref 8.9–12.7)
POTASSIUM SERPL-SCNC: 4.2 MMOL/L (ref 3.5–5.3)
PROT SERPL-MCNC: 6.8 G/DL (ref 6.4–8.2)
RBC # BLD AUTO: 4.23 MILLION/UL (ref 3.81–5.12)
SODIUM SERPL-SCNC: 141 MMOL/L (ref 136–145)
TRIGL SERPL-MCNC: 110 MG/DL
TSH SERPL DL<=0.05 MIU/L-ACNC: 1.76 UIU/ML (ref 0.36–3.74)
URATE SERPL-MCNC: 8.1 MG/DL (ref 2–6.8)
WBC # BLD AUTO: 11.21 THOUSAND/UL (ref 4.31–10.16)

## 2020-07-22 PROCEDURE — 80053 COMPREHEN METABOLIC PANEL: CPT

## 2020-07-22 PROCEDURE — 84550 ASSAY OF BLOOD/URIC ACID: CPT

## 2020-07-22 PROCEDURE — 36415 COLL VENOUS BLD VENIPUNCTURE: CPT

## 2020-07-22 PROCEDURE — 80061 LIPID PANEL: CPT

## 2020-07-22 PROCEDURE — 85025 COMPLETE CBC W/AUTO DIFF WBC: CPT

## 2020-07-22 PROCEDURE — 84443 ASSAY THYROID STIM HORMONE: CPT

## 2020-07-24 ENCOUNTER — TELEPHONE (OUTPATIENT)
Dept: FAMILY MEDICINE CLINIC | Facility: CLINIC | Age: 75
End: 2020-07-24

## 2020-07-24 DIAGNOSIS — M10.9 ACUTE GOUT OF LEFT FOOT, UNSPECIFIED CAUSE: Primary | ICD-10-CM

## 2020-07-24 RX ORDER — ALLOPURINOL 100 MG/1
100 TABLET ORAL DAILY
Qty: 30 TABLET | Refills: 5 | Status: SHIPPED | OUTPATIENT
Start: 2020-07-24 | End: 2020-08-18 | Stop reason: SDUPTHER

## 2020-07-24 NOTE — TELEPHONE ENCOUNTER
Patient called, wanted to know the results of her labs      She mentioned the uric acid was the one she was looking for most since she goes to see her foot dr

## 2020-08-18 DIAGNOSIS — M10.9 ACUTE GOUT OF LEFT FOOT, UNSPECIFIED CAUSE: ICD-10-CM

## 2020-08-18 RX ORDER — ALLOPURINOL 100 MG/1
100 TABLET ORAL DAILY
Qty: 30 TABLET | Refills: 5 | Status: SHIPPED | OUTPATIENT
Start: 2020-08-18 | End: 2020-08-19 | Stop reason: SDUPTHER

## 2020-08-19 DIAGNOSIS — M10.9 ACUTE GOUT OF LEFT FOOT, UNSPECIFIED CAUSE: ICD-10-CM

## 2020-08-19 RX ORDER — ALLOPURINOL 100 MG/1
100 TABLET ORAL DAILY
Qty: 90 TABLET | Refills: 3 | Status: SHIPPED | OUTPATIENT
Start: 2020-08-19 | End: 2020-09-21 | Stop reason: SDUPTHER

## 2020-08-26 ENCOUNTER — OFFICE VISIT (OUTPATIENT)
Dept: FAMILY MEDICINE CLINIC | Facility: CLINIC | Age: 75
End: 2020-08-26
Payer: MEDICARE

## 2020-08-26 ENCOUNTER — TELEPHONE (OUTPATIENT)
Dept: FAMILY MEDICINE CLINIC | Facility: CLINIC | Age: 75
End: 2020-08-26

## 2020-08-26 ENCOUNTER — TRANSCRIBE ORDERS (OUTPATIENT)
Dept: ADMINISTRATIVE | Facility: HOSPITAL | Age: 75
End: 2020-08-26

## 2020-08-26 VITALS
HEART RATE: 82 BPM | SYSTOLIC BLOOD PRESSURE: 135 MMHG | WEIGHT: 220.2 LBS | BODY MASS INDEX: 43.23 KG/M2 | HEIGHT: 60 IN | OXYGEN SATURATION: 96 % | TEMPERATURE: 97 F | DIASTOLIC BLOOD PRESSURE: 80 MMHG

## 2020-08-26 DIAGNOSIS — E78.2 MIXED HYPERLIPIDEMIA: ICD-10-CM

## 2020-08-26 DIAGNOSIS — Z12.31 VISIT FOR SCREENING MAMMOGRAM: ICD-10-CM

## 2020-08-26 DIAGNOSIS — M15.9 OSTEOARTHRITIS, GENERALIZED: ICD-10-CM

## 2020-08-26 DIAGNOSIS — M76.62 TENDONITIS, ACHILLES, LEFT: Primary | ICD-10-CM

## 2020-08-26 DIAGNOSIS — I10 ESSENTIAL HYPERTENSION: ICD-10-CM

## 2020-08-26 DIAGNOSIS — K58.2 IRRITABLE BOWEL SYNDROME WITH BOTH CONSTIPATION AND DIARRHEA: Primary | ICD-10-CM

## 2020-08-26 DIAGNOSIS — M79.672 LEFT FOOT PAIN: ICD-10-CM

## 2020-08-26 PROCEDURE — 99214 OFFICE O/P EST MOD 30 MIN: CPT | Performed by: FAMILY MEDICINE

## 2020-08-26 PROCEDURE — 1036F TOBACCO NON-USER: CPT | Performed by: FAMILY MEDICINE

## 2020-08-26 PROCEDURE — 3075F SYST BP GE 130 - 139MM HG: CPT | Performed by: FAMILY MEDICINE

## 2020-08-26 PROCEDURE — 3079F DIAST BP 80-89 MM HG: CPT | Performed by: FAMILY MEDICINE

## 2020-08-26 PROCEDURE — 3008F BODY MASS INDEX DOCD: CPT | Performed by: FAMILY MEDICINE

## 2020-08-26 PROCEDURE — 1160F RVW MEDS BY RX/DR IN RCRD: CPT | Performed by: FAMILY MEDICINE

## 2020-08-26 NOTE — ASSESSMENT & PLAN NOTE
Hypertension stable initially 144/86 on repeat blood pressure is at 132/80 continue with current medication without change patient doing well overall with telmisartan 80 mg and metoprolol 100 mg tablets she takes Lasix 40 mg for her leg edema

## 2020-08-26 NOTE — ASSESSMENT & PLAN NOTE
Irritable bowel syndrome stable with current diet plan no change in bowel movements recently continue with fiber

## 2020-08-26 NOTE — PROGRESS NOTES
Assessment/Plan:       Problem List Items Addressed This Visit        Digestive    Irritable bowel syndrome with both constipation and diarrhea - Primary     Irritable bowel syndrome stable with current diet plan no change in bowel movements recently continue with fiber         Relevant Orders    Uric acid    Comprehensive metabolic panel    C-reactive protein       Cardiovascular and Mediastinum    Essential hypertension      Hypertension stable initially 144/86 on repeat blood pressure is at 132/80 continue with current medication without change patient doing well overall with telmisartan 80 mg and metoprolol 100 mg tablets she takes Lasix 40 mg for her leg edema         Relevant Orders    Uric acid    Comprehensive metabolic panel    C-reactive protein       Musculoskeletal and Integument    Osteoarthritis, generalized      Generalized osteoarthritis stable patient will continue to work on weight reduction and daily exercise         Relevant Orders    Uric acid    Comprehensive metabolic panel    C-reactive protein       Other    Mixed hyperlipidemia      Mixed hyperlipidemia stable currently follow-up with laboratory work as scheduled every 6 months         Relevant Orders    Uric acid    Comprehensive metabolic panel    C-reactive protein    Left foot pain     Recurrent left foot pain and swelling in etiology it was thought that this was gout related and she has been followed by Podiatry frequently last seen twice over the last 2 weeks and once back in July given a cortisone shot which relieved her pain on this episode she developed swelling and pain in her left foot making it difficult to weight bear and ambulate she came home from [place] and was seen by Podiatry symptoms resolved quickly without specific treatment this episode  MRI has been ordered through Podiatry  She continues with allopurinol    Await results of MRI continue with current medications and consider Rheumatology evaluation in light of diffuse osteoarthritis         Relevant Orders    Uric acid    Comprehensive metabolic panel    C-reactive protein            Subjective:      Patient ID: Pio Smith is a 66 y o  female  Patient presents today for general checkup follow-up evaluation doing well from the gout that resolved now in her bowel movements have stabilized with fiber  Left foot pain and swelling in both feet while on vacation in [place] home saw Podiatry last Friday then again yesterday  The following portions of the patient's history were reviewed and updated as appropriate: allergies, current medications, past family history, past medical history, past social history, past surgical history and problem list     Review of Systems   Constitutional: Negative for chills, fatigue and fever  HENT: Negative for congestion, nosebleeds, rhinorrhea, sinus pressure and sore throat  Eyes: Negative for discharge and redness  Respiratory: Negative for cough and shortness of breath  Cardiovascular: Negative for chest pain, palpitations and leg swelling  Gastrointestinal: Negative for abdominal pain, blood in stool and nausea  Endocrine: Negative for cold intolerance, heat intolerance and polyuria  Genitourinary: Negative for dysuria and frequency  Musculoskeletal: Negative for arthralgias, back pain and myalgias  Skin: Negative for rash  Neurological: Negative for dizziness, weakness and headaches  Hematological: Negative for adenopathy  Psychiatric/Behavioral: Negative for behavioral problems and sleep disturbance  The patient is not nervous/anxious  Objective:      /80   Pulse 82   Temp (!) 97 °F (36 1 °C)   Ht 5' (1 524 m)   Wt 99 9 kg (220 lb 3 2 oz)   SpO2 96%   BMI 43 00 kg/m²        Physical Exam  Vitals signs and nursing note reviewed  Constitutional:       General: She is not in acute distress  Appearance: She is well-developed  She is obese     HENT:      Head: Normocephalic and atraumatic  Right Ear: External ear normal       Left Ear: External ear normal       Nose: Nose normal       Mouth/Throat:      Pharynx: No oropharyngeal exudate  Eyes:      General: No scleral icterus  Right eye: No discharge  Left eye: No discharge  Conjunctiva/sclera: Conjunctivae normal       Pupils: Pupils are equal, round, and reactive to light  Neck:      Musculoskeletal: Normal range of motion  Thyroid: No thyromegaly  Vascular: No JVD  Cardiovascular:      Rate and Rhythm: Normal rate and regular rhythm  Heart sounds: Normal heart sounds  No murmur  Pulmonary:      Effort: Pulmonary effort is normal       Breath sounds: No wheezing or rales  Chest:      Chest wall: No tenderness  Abdominal:      General: Bowel sounds are normal  There is no distension  Palpations: Abdomen is soft  There is no mass  Tenderness: There is no abdominal tenderness  Musculoskeletal: Normal range of motion  General: No tenderness or deformity  Lymphadenopathy:      Cervical: No cervical adenopathy  Skin:     General: Skin is warm and dry  Findings: No rash  Neurological:      Mental Status: She is alert and oriented to person, place, and time  Cranial Nerves: No cranial nerve deficit  Coordination: Coordination normal       Deep Tendon Reflexes: Reflexes are normal and symmetric  Reflexes normal    Psychiatric:         Behavior: Behavior normal          Thought Content: Thought content normal          Judgment: Judgment normal           Data:    Laboratory Results: I have personally reviewed the pertinent laboratory results/reports   Radiology/Other Diagnostic Testing Results: I have personally reviewed pertinent reports         Lab Results   Component Value Date    WBC 11 21 (H) 07/22/2020    HGB 13 1 07/22/2020    HCT 41 0 07/22/2020    MCV 97 07/22/2020     07/22/2020     Lab Results   Component Value Date     04/16/2018 K 4 2 07/22/2020     07/22/2020    CO2 29 07/22/2020    ANIONGAP 11 9 04/16/2018    BUN 33 (H) 07/22/2020    CREATININE 0 97 07/22/2020    GLUF 72 07/22/2020    CALCIUM 9 8 07/22/2020    AST 18 07/22/2020    ALT 24 07/22/2020    ALKPHOS 61 07/22/2020    PROT 6 3 (L) 04/16/2018    BILITOT 0 4 04/16/2018    EGFR 57 07/22/2020     Lab Results   Component Value Date    CHOLESTEROL 172 07/22/2020    CHOLESTEROL 214 (H) 03/13/2020    CHOLESTEROL 196 02/27/2019     Lab Results   Component Value Date    HDL 55 07/22/2020    HDL 72 03/13/2020    HDL 74 (H) 02/27/2019     Lab Results   Component Value Date    LDLCALC 95 07/22/2020    LDLCALC 125 (H) 03/13/2020    LDLCALC 107 (H) 02/27/2019     Lab Results   Component Value Date    TRIG 110 07/22/2020    TRIG 84 03/13/2020    TRIG 74 02/27/2019     No results found for: [place], [place]  Lab Results   Component Value Date    VTC8TZXETCSO 1 760 07/22/2020     No results found for: HGBA1C  No results found for: PSA    Romulo Kaiser DO

## 2020-08-26 NOTE — TELEPHONE ENCOUNTER
I printed a 2nd after visit summary including a low purine diet for people with gout and what foods to avoid this can be given to patient

## 2020-08-26 NOTE — ASSESSMENT & PLAN NOTE
Generalized osteoarthritis stable patient will continue to work on weight reduction and daily exercise

## 2020-08-26 NOTE — ASSESSMENT & PLAN NOTE
Recurrent left foot pain and swelling in etiology it was thought that this was gout related and she has been followed by Podiatry frequently last seen twice over the last 2 weeks and once back in July given a cortisone shot which relieved her pain on this episode she developed swelling and pain in her left foot making it difficult to weight bear and ambulate she came home from [place] and was seen by Podiatry symptoms resolved quickly without specific treatment this episode  MRI has been ordered through Podiatry  She continues with allopurinol    Await results of MRI continue with current medications and consider Rheumatology evaluation in light of diffuse osteoarthritis

## 2020-08-26 NOTE — PATIENT INSTRUCTIONS
Low Purine Diet   WHAT YOU NEED TO KNOW:   A low-purine diet is a meal plan based on foods that are low in purine content  Purine is a substance that is found in foods and is produced naturally by the body  Purines are broken down by the body and changed to uric acid  The kidneys normally filter the uric acid, and it leaves the body through the urine  However, people with gout sometimes have a buildup of uric acid in the blood  This buildup of uric acid can cause swelling and pain (a gout attack)  A low-purine diet may help to treat and prevent gout attacks  DISCHARGE INSTRUCTIONS:   Foods to include: The following foods are low in purine  · Eggs, nuts, and peanut butter    · Low-fat and fat-free cheese and ice cream    · Skim or 1% milk    · Soup made without meat extract or broth    · Vegetables that are not on the medium-purine list below    · All fruit and fruit juice    · Bread, pasta, rice, cakes, cornbread, and popcorn    · Water, soda, tea, coffee, and cocoa    · Sugar, sweets, and gelatin    · Fat and oil  Foods to limit:   · Medium-purine foods:     ¨ Meats:  Limit the following to 4 to 6 ounces each day  ¨ Meat and poultry     ¨ Crab, lobster, oysters, and shrimp    ¨ Vegetables:  Limit the following vegetables to ½ cup each day  ¨ Asparagus    ¨ Cauliflower    ¨ Spinach    ¨ Mushrooms    ¨ Green peas    ¨ Beans, peas, and lentils (limit to 1 cup each day)    ¨ Oats and oatmeal (limit to ? cup uncooked each day)    ¨ Wheat germ and bran (limit to ¼ cup each day)    · High-purine foods:  Limit or avoid foods high in purine  ¨ Anchovies, sardines, scallops, and mussels    ¨ Tuna, codfish, herring, and [employer], like goose and duck    [employer], such as brains, heart, kidney, liver, and sweetbreads    ¨ Gravies and sauces made with meat    ¨ Yeast extracts taken in the form of a supplement  Other guidelines to follow:   · Increase liquid intake    Drink 8 to 16 (eight-ounce) cups of liquid each day  At least half of the liquid you drink should be water  Liquid can help your body get rid of extra uric acid  · Limit or avoid alcohol  Alcohol (especially beer) increases your risk of a gout attack  Beer contains a high amount of purine  · Maintain a healthy weight  If you are overweight, you should lose weight slowly  Weight loss can help decrease the amount of stress on your joints  Regular exercise can help you lose weight if you are overweight, or maintain your weight if you are at a normal weight  Talk to your healthcare provider before you begin an exercise program   © 2017 [employer] Information is for End User's use only and may not be sold, redistributed or otherwise used for commercial purposes  All illustrations and images included in CareNotes® are the copyrighted property of A D [employer] M [employer] Inc  or [employer] Huff[employer]  The above information is an  only  It is not intended as medical advice for individual conditions or treatments  Talk to your doctor, nurse or pharmacist before following any medical regimen to see if it is safe and effective for you

## 2020-08-27 ENCOUNTER — APPOINTMENT (OUTPATIENT)
Dept: LAB | Facility: CLINIC | Age: 75
End: 2020-08-27
Payer: MEDICARE

## 2020-08-27 DIAGNOSIS — K58.2 IRRITABLE BOWEL SYNDROME WITH BOTH CONSTIPATION AND DIARRHEA: ICD-10-CM

## 2020-08-27 DIAGNOSIS — M79.672 LEFT FOOT PAIN: ICD-10-CM

## 2020-08-27 DIAGNOSIS — I10 ESSENTIAL HYPERTENSION: ICD-10-CM

## 2020-08-27 DIAGNOSIS — M15.9 OSTEOARTHRITIS, GENERALIZED: ICD-10-CM

## 2020-08-27 DIAGNOSIS — E78.2 MIXED HYPERLIPIDEMIA: ICD-10-CM

## 2020-08-27 LAB
ALBUMIN SERPL BCP-MCNC: 3.5 G/DL (ref 3.5–5)
ALP SERPL-CCNC: 59 U/L (ref 46–116)
ALT SERPL W P-5'-P-CCNC: 27 U/L (ref 12–78)
ANION GAP SERPL CALCULATED.3IONS-SCNC: 6 MMOL/L (ref 4–13)
AST SERPL W P-5'-P-CCNC: 24 U/L (ref 5–45)
BILIRUB SERPL-MCNC: 0.39 MG/DL (ref 0.2–1)
BUN SERPL-MCNC: 26 MG/DL (ref 5–25)
CALCIUM SERPL-MCNC: 9 MG/DL (ref 8.3–10.1)
CHLORIDE SERPL-SCNC: 106 MMOL/L (ref 100–108)
CO2 SERPL-SCNC: 30 MMOL/L (ref 21–32)
CREAT SERPL-MCNC: 0.93 MG/DL (ref 0.6–1.3)
CRP SERPL QL: 6.2 MG/L
GFR SERPL CREATININE-BSD FRML MDRD: 59 ML/MIN/1.73SQ M
GLUCOSE P FAST SERPL-MCNC: 85 MG/DL (ref 65–99)
POTASSIUM SERPL-SCNC: 3.9 MMOL/L (ref 3.5–5.3)
PROT SERPL-MCNC: 6.7 G/DL (ref 6.4–8.2)
SODIUM SERPL-SCNC: 142 MMOL/L (ref 136–145)
URATE SERPL-MCNC: 6.1 MG/DL (ref 2–6.8)

## 2020-08-27 PROCEDURE — 86140 C-REACTIVE PROTEIN: CPT

## 2020-08-27 PROCEDURE — 80053 COMPREHEN METABOLIC PANEL: CPT

## 2020-08-27 PROCEDURE — 36415 COLL VENOUS BLD VENIPUNCTURE: CPT

## 2020-08-27 PROCEDURE — 84550 ASSAY OF BLOOD/URIC ACID: CPT

## 2020-08-31 ENCOUNTER — TELEPHONE (OUTPATIENT)
Dept: FAMILY MEDICINE CLINIC | Facility: CLINIC | Age: 75
End: 2020-08-31

## 2020-09-21 DIAGNOSIS — M10.9 ACUTE GOUT OF LEFT FOOT, UNSPECIFIED CAUSE: ICD-10-CM

## 2020-09-21 RX ORDER — ALLOPURINOL 100 MG/1
100 TABLET ORAL DAILY
Qty: 7 TABLET | Refills: 1 | Status: SHIPPED | OUTPATIENT
Start: 2020-09-21 | End: 2021-08-23

## 2020-09-21 NOTE — TELEPHONE ENCOUNTER
[place] [facility] [place] can you send 7 days to [facility] in [facility]

## 2020-11-17 ENCOUNTER — HOSPITAL ENCOUNTER (OUTPATIENT)
Dept: MAMMOGRAPHY | Facility: HOSPITAL | Age: 75
Discharge: HOME | End: 2020-11-17
Attending: FAMILY MEDICINE
Payer: MEDICARE

## 2020-11-17 VITALS — BODY MASS INDEX: 43.19 KG/M2 | HEIGHT: 60 IN | WEIGHT: 220 LBS

## 2020-11-17 DIAGNOSIS — Z12.31 VISIT FOR SCREENING MAMMOGRAM: ICD-10-CM

## 2020-11-17 DIAGNOSIS — Z12.31 ENCOUNTER FOR SCREENING MAMMOGRAM FOR MALIGNANT NEOPLASM OF BREAST: ICD-10-CM

## 2020-11-17 PROCEDURE — 77067 SCR MAMMO BI INCL CAD: CPT

## 2020-11-17 PROCEDURE — 77063 BREAST TOMOSYNTHESIS BI: CPT

## 2021-02-08 DIAGNOSIS — I10 ESSENTIAL HYPERTENSION: ICD-10-CM

## 2021-02-08 RX ORDER — TELMISARTAN 80 MG/1
TABLET ORAL
Qty: 90 TABLET | Refills: 2 | OUTPATIENT
Start: 2021-02-08

## 2021-02-08 NOTE — TELEPHONE ENCOUNTER
Patient is due for her 6 month appointment    Medication can be refilled if she needs a 30 day supply before she can get in

## 2021-02-09 ENCOUNTER — TELEPHONE (OUTPATIENT)
Dept: FAMILY MEDICINE CLINIC | Facility: CLINIC | Age: 76
End: 2021-02-09

## 2021-02-09 DIAGNOSIS — I10 ESSENTIAL HYPERTENSION: ICD-10-CM

## 2021-02-09 DIAGNOSIS — E78.2 MIXED HYPERLIPIDEMIA: ICD-10-CM

## 2021-02-09 DIAGNOSIS — K58.2 IRRITABLE BOWEL SYNDROME WITH BOTH CONSTIPATION AND DIARRHEA: Primary | ICD-10-CM

## 2021-02-09 RX ORDER — TELMISARTAN 80 MG/1
80 TABLET ORAL DAILY
Qty: 90 TABLET | Refills: 0 | Status: SHIPPED | OUTPATIENT
Start: 2021-02-09 | End: 2021-05-03

## 2021-02-09 NOTE — TELEPHONE ENCOUNTER
----- Message from Tonna Ganser, DO sent at 2/9/2021  1:29 PM EST -----  Lab work ordered for patient notify her do this fasting prior to appointment  ----- Message -----  From: Luis Rousseau MA  Sent: 2/9/2021   9:16 AM EST  To: Tonna Ganser, DO    Pt called asking if you can order blood work for her to go done before she see in on 03/17    Ty

## 2021-03-16 ENCOUNTER — APPOINTMENT (OUTPATIENT)
Dept: LAB | Facility: CLINIC | Age: 76
End: 2021-03-16
Payer: MEDICARE

## 2021-03-16 DIAGNOSIS — I10 ESSENTIAL HYPERTENSION: ICD-10-CM

## 2021-03-16 DIAGNOSIS — K58.2 IRRITABLE BOWEL SYNDROME WITH BOTH CONSTIPATION AND DIARRHEA: ICD-10-CM

## 2021-03-16 DIAGNOSIS — E78.2 MIXED HYPERLIPIDEMIA: ICD-10-CM

## 2021-03-16 LAB
ALBUMIN SERPL BCP-MCNC: 3.6 G/DL (ref 3.5–5)
ALP SERPL-CCNC: 68 U/L (ref 46–116)
ALT SERPL W P-5'-P-CCNC: 33 U/L (ref 12–78)
ANION GAP SERPL CALCULATED.3IONS-SCNC: 5 MMOL/L (ref 4–13)
AST SERPL W P-5'-P-CCNC: 21 U/L (ref 5–45)
BASOPHILS # BLD AUTO: 0.05 THOUSANDS/ΜL (ref 0–0.1)
BASOPHILS NFR BLD AUTO: 1 % (ref 0–1)
BILIRUB SERPL-MCNC: 0.56 MG/DL (ref 0.2–1)
BUN SERPL-MCNC: 32 MG/DL (ref 5–25)
CALCIUM SERPL-MCNC: 9.7 MG/DL (ref 8.3–10.1)
CHLORIDE SERPL-SCNC: 109 MMOL/L (ref 100–108)
CHOLEST SERPL-MCNC: 239 MG/DL (ref 50–200)
CO2 SERPL-SCNC: 28 MMOL/L (ref 21–32)
CREAT SERPL-MCNC: 0.98 MG/DL (ref 0.6–1.3)
EOSINOPHIL # BLD AUTO: 0.01 THOUSAND/ΜL (ref 0–0.61)
EOSINOPHIL NFR BLD AUTO: 0 % (ref 0–6)
ERYTHROCYTE [DISTWIDTH] IN BLOOD BY AUTOMATED COUNT: 14.2 % (ref 11.6–15.1)
GFR SERPL CREATININE-BSD FRML MDRD: 55 ML/MIN/1.73SQ M
GLUCOSE P FAST SERPL-MCNC: 87 MG/DL (ref 65–99)
HCT VFR BLD AUTO: 41.4 % (ref 34.8–46.1)
HDLC SERPL-MCNC: 71 MG/DL
HGB BLD-MCNC: 13.3 G/DL (ref 11.5–15.4)
IMM GRANULOCYTES # BLD AUTO: 0.02 THOUSAND/UL (ref 0–0.2)
IMM GRANULOCYTES NFR BLD AUTO: 0 % (ref 0–2)
LDLC SERPL CALC-MCNC: 151 MG/DL (ref 0–100)
LYMPHOCYTES # BLD AUTO: 1.93 THOUSANDS/ΜL (ref 0.6–4.47)
LYMPHOCYTES NFR BLD AUTO: 27 % (ref 14–44)
MCH RBC QN AUTO: 31.1 PG (ref 26.8–34.3)
MCHC RBC AUTO-ENTMCNC: 32.1 G/DL (ref 31.4–37.4)
MCV RBC AUTO: 97 FL (ref 82–98)
MONOCYTES # BLD AUTO: 0.74 THOUSAND/ΜL (ref 0.17–1.22)
MONOCYTES NFR BLD AUTO: 10 % (ref 4–12)
NEUTROPHILS # BLD AUTO: 4.37 THOUSANDS/ΜL (ref 1.85–7.62)
NEUTS SEG NFR BLD AUTO: 62 % (ref 43–75)
NONHDLC SERPL-MCNC: 168 MG/DL
NRBC BLD AUTO-RTO: 0 /100 WBCS
PLATELET # BLD AUTO: 303 THOUSANDS/UL (ref 149–390)
PMV BLD AUTO: 10.1 FL (ref 8.9–12.7)
POTASSIUM SERPL-SCNC: 4 MMOL/L (ref 3.5–5.3)
PROT SERPL-MCNC: 7.2 G/DL (ref 6.4–8.2)
RBC # BLD AUTO: 4.28 MILLION/UL (ref 3.81–5.12)
SODIUM SERPL-SCNC: 142 MMOL/L (ref 136–145)
TRIGL SERPL-MCNC: 85 MG/DL
TSH SERPL DL<=0.05 MIU/L-ACNC: 1.39 UIU/ML (ref 0.36–3.74)
WBC # BLD AUTO: 7.12 THOUSAND/UL (ref 4.31–10.16)

## 2021-03-16 PROCEDURE — 85025 COMPLETE CBC W/AUTO DIFF WBC: CPT

## 2021-03-16 PROCEDURE — 84443 ASSAY THYROID STIM HORMONE: CPT

## 2021-03-16 PROCEDURE — 80061 LIPID PANEL: CPT

## 2021-03-16 PROCEDURE — 80053 COMPREHEN METABOLIC PANEL: CPT

## 2021-03-16 PROCEDURE — 36415 COLL VENOUS BLD VENIPUNCTURE: CPT

## 2021-03-17 ENCOUNTER — OFFICE VISIT (OUTPATIENT)
Dept: FAMILY MEDICINE CLINIC | Facility: CLINIC | Age: 76
End: 2021-03-17
Payer: MEDICARE

## 2021-03-17 VITALS
HEART RATE: 91 BPM | SYSTOLIC BLOOD PRESSURE: 134 MMHG | OXYGEN SATURATION: 97 % | WEIGHT: 227 LBS | HEIGHT: 60 IN | DIASTOLIC BLOOD PRESSURE: 80 MMHG | BODY MASS INDEX: 44.57 KG/M2 | TEMPERATURE: 97.5 F

## 2021-03-17 DIAGNOSIS — M25.471 ANKLE EDEMA, BILATERAL: ICD-10-CM

## 2021-03-17 DIAGNOSIS — M15.9 OSTEOARTHRITIS, GENERALIZED: ICD-10-CM

## 2021-03-17 DIAGNOSIS — I10 ESSENTIAL HYPERTENSION: Primary | ICD-10-CM

## 2021-03-17 DIAGNOSIS — E78.2 MIXED HYPERLIPIDEMIA: ICD-10-CM

## 2021-03-17 DIAGNOSIS — F41.1 GENERALIZED ANXIETY DISORDER: ICD-10-CM

## 2021-03-17 DIAGNOSIS — M25.472 ANKLE EDEMA, BILATERAL: ICD-10-CM

## 2021-03-17 PROCEDURE — 99214 OFFICE O/P EST MOD 30 MIN: CPT | Performed by: FAMILY MEDICINE

## 2021-03-17 NOTE — PROGRESS NOTES
BMI Counseling: Body mass index is 44 33 kg/m²  The BMI is above normal  Nutrition recommendations include reducing portion sizes, decreasing overall calorie intake, 3-5 servings of fruits/vegetables daily, reducing fast food intake, consuming healthier snacks, decreasing soda and/or juice intake, moderation in carbohydrate intake, increasing intake of lean protein, reducing intake of saturated fat and trans fat and reducing intake of cholesterol  Exercise recommendations include exercising 3-5 times per week

## 2021-03-17 NOTE — ASSESSMENT & PLAN NOTE
Continue heart healthy diet avoiding saturated fats reviewed laboratory work and repeat at next office visit

## 2021-03-17 NOTE — ASSESSMENT & PLAN NOTE
Generalized osteoarthritis continue with weight reduction and daily exercise along with allopurinol 100 mg

## 2021-03-17 NOTE — PATIENT INSTRUCTIONS
Heart Healthy Diet   WHAT YOU NEED TO KNOW:   A heart healthy diet is an eating plan low in unhealthy fats and sodium (salt)  The plan is high in healthy fats and fiber  A heart healthy diet helps improve your cholesterol levels and lowers your risk for heart disease and stroke  A dietitian will teach you how to read and understand food labels  DISCHARGE INSTRUCTIONS:   Heart healthy diet guidelines to follow:   · Choose foods that contain healthy fats  ? Unsaturated fats  include monounsaturated and polyunsaturated fats  Unsaturated fat is found in foods such as soybean, canola, olive, corn, and safflower oils  It is also found in soft tub margarine that is made with liquid vegetable oil  ? Omega-3 fat  is found in certain fish, such as salmon, tuna, and trout, and in walnuts and flaxseed  Eat fish high in omega-3 fats at least 2 times a week  · Get 20 to 30 grams of fiber each day  Fruits, vegetables, whole-grain foods, and legumes (cooked beans) are good sources of fiber  · Limit or do not have unhealthy fats  ? Cholesterol  is found in animal foods, such as eggs and lobster, and in dairy products made from whole milk  Limit cholesterol to less than 200 mg each day  ? Saturated fat  is found in meats, such as davenport and hamburger  It is also found in chicken or turkey skin, whole milk, and butter  Limit saturated fat to less than 7% of your total daily calories  ? Trans fat  is found in packaged foods, such as potato chips and cookies  It is also in hard margarine, some fried foods, and shortening  Do not eat foods that contain trans fats  · Limit sodium as directed  You may be told to limit sodium to 2,000 to 2,300 mg each day  Choose low-sodium or no-salt-added foods  Add little or no salt to food you prepare  Use herbs and spices in place of salt         Include the following in your heart healthy plan:  Ask your dietitian or healthcare provider how many servings to have from each of the following food groups:  · Grains:      ? Whole-wheat breads, cereals, and pastas, and brown rice    ? Low-fat, low-sodium crackers and chips    · Vegetables:      ? Broccoli, green beans, green peas, and spinach    ? Collards, kale, and lima beans    ? Carrots, sweet potatoes, tomatoes, and peppers    ? Canned vegetables with no salt added    · Fruits:      ? Bananas, peaches, pears, and pineapple    ? Grapes, raisins, and dates    ? Oranges, tangerines, grapefruit, orange juice, and grapefruit juice    ? Apricots, mangoes, melons, and papaya    ? Raspberries and strawberries    ? Canned fruit with no added sugar    · Low-fat dairy:      ? Nonfat (skim) milk, 1% milk, and low-fat almond, cashew, or soy milks fortified with calcium    ? Low-fat cheese, regular or frozen yogurt, and cottage cheese    · Meats and proteins:      ? Lean cuts of beef and pork (loin, leg, round), skinless chicken and turkey    ? Legumes, soy products, egg whites, or nuts    Limit or do not include the following in your heart healthy plan:   · Unhealthy fats and oils:      ? Whole or 2% milk, cream cheese, sour cream, or cheese    ? High-fat cuts of beef (T-bone steaks, ribs), chicken or turkey with skin, and organ meats such as liver    ? Butter, stick margarine, shortening, and cooking oils such as coconut or palm oil    · Foods and liquids high in sodium:      ? Packaged foods, such as frozen dinners, cookies, macaroni and cheese, and cereals with more than 300 mg of sodium per serving    ? Vegetables with added sodium, such as instant potatoes, vegetables with added sauces, or regular canned vegetables    ? Cured or smoked meats, such as hot dogs, davenport, and sausage    ? High-sodium ketchup, barbecue sauce, salad dressing, pickles, olives, soy sauce, or miso    · Foods and liquids high in sugar:      ? Candy, cake, cookies, pies, or doughnuts    ? Soft drinks (soda), sports drinks, or sweetened tea    ?  Canned or dry mixes for cakes, soups, sauces, or gravies    Other healthy heart guidelines:   · Do not smoke  Nicotine and other chemicals in cigarettes and cigars can cause lung and heart damage  Ask your healthcare provider for information if you currently smoke and need help to quit  E-cigarettes or smokeless tobacco still contain nicotine  Talk to your healthcare provider before you use these products  · Limit or do not drink alcohol as directed  Alcohol can damage your heart and raise your blood pressure  Your healthcare provider may give you specific daily and weekly limits  The general recommended limit is 1 drink a day for women 21 or older and for men 72 or older  Do not have more than 3 drinks in a day or 7 in a week  The recommended limit is 2 drinks a day for men 24to 59years of age  Do not have more than 4 drinks in a day or 14 in a week  A drink of alcohol is 12 ounces of beer, 5 ounces of wine, or 1½ ounces of liquor  · Exercise regularly  Exercise can help you maintain a healthy weight and improve your blood pressure and cholesterol levels  Regular exercise can also decrease your risk for heart problems  Ask your healthcare provider about the best exercise plan for you  Do not start an exercise program without asking your healthcare provider  Follow up with your doctor or cardiologist as directed:  Write down your questions so you remember to ask them during your visits  © Copyright [address] Information is for End User's use only and may not be sold, redistributed or otherwise used for commercial purposes  All illustrations and images included in CareNotes® are the copyrighted property of A D A M , [employer]  or [address] [place] [address]  The above information is an  only  It is not intended as medical advice for individual conditions or treatments  Talk to your doctor, nurse or pharmacist before following any medical regimen to see if it is safe and effective for you

## 2021-05-01 DIAGNOSIS — I10 ESSENTIAL HYPERTENSION: ICD-10-CM

## 2021-05-01 DIAGNOSIS — R60.9 EDEMA, UNSPECIFIED TYPE: ICD-10-CM

## 2021-05-03 RX ORDER — FUROSEMIDE 40 MG/1
TABLET ORAL
Qty: 90 TABLET | Refills: 3 | Status: SHIPPED | OUTPATIENT
Start: 2021-05-03

## 2021-05-03 RX ORDER — METOPROLOL TARTRATE 100 MG/1
TABLET ORAL
Qty: 180 TABLET | Refills: 3 | Status: SHIPPED | OUTPATIENT
Start: 2021-05-03

## 2021-05-03 RX ORDER — TELMISARTAN 80 MG/1
80 TABLET ORAL DAILY
Qty: 90 TABLET | Refills: 0 | Status: SHIPPED | OUTPATIENT
Start: 2021-05-03

## 2021-08-22 DIAGNOSIS — M10.9 ACUTE GOUT OF LEFT FOOT, UNSPECIFIED CAUSE: ICD-10-CM

## 2021-08-23 RX ORDER — ALLOPURINOL 100 MG/1
TABLET ORAL
Qty: 90 TABLET | Refills: 0 | Status: SHIPPED | OUTPATIENT
Start: 2021-08-23 | End: 2021-09-01 | Stop reason: SDUPTHER

## 2021-09-01 DIAGNOSIS — M10.9 ACUTE GOUT OF LEFT FOOT, UNSPECIFIED CAUSE: ICD-10-CM

## 2021-09-01 RX ORDER — ALLOPURINOL 100 MG/1
100 TABLET ORAL DAILY
Qty: 90 TABLET | Refills: 0 | Status: SHIPPED | OUTPATIENT
Start: 2021-09-01

## 2021-09-24 PROBLEM — E66.01 MORBID OBESITY WITH BMI OF 40.0-44.9, ADULT (HCC): Status: ACTIVE | Noted: 2021-03-17

## 2021-09-29 ENCOUNTER — OFFICE VISIT (OUTPATIENT)
Dept: FAMILY MEDICINE CLINIC | Facility: CLINIC | Age: 76
End: 2021-09-29
Payer: MEDICARE

## 2021-09-29 VITALS
HEART RATE: 59 BPM | OXYGEN SATURATION: 96 % | DIASTOLIC BLOOD PRESSURE: 70 MMHG | WEIGHT: 216.2 LBS | HEIGHT: 60 IN | BODY MASS INDEX: 42.45 KG/M2 | RESPIRATION RATE: 19 BRPM | SYSTOLIC BLOOD PRESSURE: 120 MMHG | TEMPERATURE: 98.9 F

## 2021-09-29 DIAGNOSIS — I10 ESSENTIAL HYPERTENSION: ICD-10-CM

## 2021-09-29 DIAGNOSIS — M25.471 ANKLE EDEMA, BILATERAL: Primary | ICD-10-CM

## 2021-09-29 DIAGNOSIS — Z00.00 MEDICARE ANNUAL WELLNESS VISIT, SUBSEQUENT: ICD-10-CM

## 2021-09-29 DIAGNOSIS — K58.2 IRRITABLE BOWEL SYNDROME WITH BOTH CONSTIPATION AND DIARRHEA: ICD-10-CM

## 2021-09-29 DIAGNOSIS — M15.9 OSTEOARTHRITIS, GENERALIZED: ICD-10-CM

## 2021-09-29 DIAGNOSIS — M25.472 ANKLE EDEMA, BILATERAL: Primary | ICD-10-CM

## 2021-09-29 DIAGNOSIS — E78.2 MIXED HYPERLIPIDEMIA: ICD-10-CM

## 2021-09-29 PROCEDURE — 99214 OFFICE O/P EST MOD 30 MIN: CPT | Performed by: FAMILY MEDICINE

## 2021-09-29 PROCEDURE — 1123F ACP DISCUSS/DSCN MKR DOCD: CPT | Performed by: FAMILY MEDICINE

## 2021-09-29 PROCEDURE — G0439 PPPS, SUBSEQ VISIT: HCPCS | Performed by: FAMILY MEDICINE

## 2021-09-29 NOTE — PROGRESS NOTES
Assessment/Plan:       Problem List Items Addressed This Visit        Digestive    Irritable bowel syndrome with both constipation and diarrhea     Stable no changes currently   Cardiovascular and Mediastinum    Essential hypertension     Hypertension under stable control with current medication regimen no change in dosing avoid sodium re-evaluate in 6 months            Musculoskeletal and Integument    Osteoarthritis, generalized     Continue daily medication as directed work on exercise and weight reduction            Other    Mixed hyperlipidemia     Remain on same medication without change         Ankle edema, bilateral - Primary     Patient has bilateral ankle edema not causing pain at this time for her questions side effects from her medication I do recommend working on weight reduction avoidance of sodium in the wintertime she can work compression socks if tolerable and continue with Lasix as needed and as prescribed         Medicare annual wellness visit, subsequent            Subjective:      Patient ID: Yaya Bradley is a 64 y o  female  Patient presents for six-month follow-up evaluation lab work will be done at her next visit she will continue on her current medications she is doing well no change in bowel movements no chest pain shortness of breath      The following portions of the patient's history were reviewed and updated as appropriate: allergies, current medications, past family history, past medical history, past social history, past surgical history and problem list     Review of Systems   Constitutional: Negative for chills, fatigue and fever  HENT: Negative for congestion, nosebleeds, rhinorrhea, sinus pressure and sore throat  Eyes: Negative for discharge and redness  Respiratory: Negative for cough and shortness of breath  Cardiovascular: Negative for chest pain, palpitations and leg swelling     Gastrointestinal: Negative for abdominal pain, blood in stool and nausea  Endocrine: Negative for cold intolerance, heat intolerance and polyuria  Genitourinary: Negative for dysuria and frequency  Musculoskeletal: Positive for arthralgias  Negative for back pain and myalgias  Skin: Negative for rash  Neurological: Negative for dizziness, weakness and headaches  Hematological: Negative for adenopathy  Psychiatric/Behavioral: Negative for behavioral problems and sleep disturbance  The patient is not nervous/anxious  Objective:      /70 (BP Location: Left arm, Patient Position: Sitting)   Pulse 59   Temp 98 9 °F (37 2 °C)   Resp 19   Ht 5' (1 524 m)   Wt 98 1 kg (216 lb 3 2 oz)   SpO2 96%   BMI 42 22 kg/m²        Physical Exam  Vitals and nursing note reviewed  Constitutional:       General: She is not in acute distress  Appearance: Normal appearance  She is well-developed  She is obese  HENT:      Head: Normocephalic and atraumatic  Right Ear: Tympanic membrane and external ear normal       Left Ear: Tympanic membrane and external ear normal       Nose: Nose normal       Mouth/Throat:      Mouth: Mucous membranes are moist       Pharynx: Oropharynx is clear  No oropharyngeal exudate  Eyes:      General: No scleral icterus  Right eye: No discharge  Left eye: No discharge  Conjunctiva/sclera: Conjunctivae normal       Pupils: Pupils are equal, round, and reactive to light  Neck:      Thyroid: No thyromegaly  Vascular: No JVD  Cardiovascular:      Rate and Rhythm: Normal rate and regular rhythm  Pulses: Normal pulses  Heart sounds: Normal heart sounds  No murmur heard  Pulmonary:      Effort: Pulmonary effort is normal       Breath sounds: No wheezing or rales  Chest:      Chest wall: No tenderness  Abdominal:      General: Bowel sounds are normal  There is no distension  Palpations: Abdomen is soft  There is no mass  Tenderness: There is no abdominal tenderness  Musculoskeletal:         General: No tenderness or deformity  Normal range of motion  Cervical back: Normal range of motion  Lymphadenopathy:      Cervical: No cervical adenopathy  Skin:     General: Skin is warm and dry  Findings: No rash  Neurological:      General: No focal deficit present  Mental Status: She is alert and oriented to person, place, and time  Mental status is at baseline  Cranial Nerves: No cranial nerve deficit  Coordination: Coordination normal       Deep Tendon Reflexes: Reflexes are normal and symmetric  Reflexes normal    Psychiatric:         Mood and Affect: Mood normal          Behavior: Behavior normal          Thought Content: Thought content normal          Judgment: Judgment normal           Data:    Laboratory Results: I have personally reviewed the pertinent laboratory results/reports   Radiology/Other Diagnostic Testing Results: I have personally reviewed pertinent reports         Lab Results   Component Value Date    WBC 7 12 03/16/2021    HGB 13 3 03/16/2021    HCT 41 4 03/16/2021    MCV 97 03/16/2021     03/16/2021     Lab Results   Component Value Date     04/16/2018    K 4 0 03/16/2021     (H) 03/16/2021    CO2 28 03/16/2021    ANIONGAP 11 9 04/16/2018    BUN 32 (H) 03/16/2021    CREATININE 0 98 03/16/2021    GLUF 87 03/16/2021    CALCIUM 9 7 03/16/2021    AST 21 03/16/2021    ALT 33 03/16/2021    ALKPHOS 68 03/16/2021    PROT 6 3 (L) 04/16/2018    BILITOT 0 4 04/16/2018    EGFR 55 03/16/2021     Lab Results   Component Value Date    CHOLESTEROL 239 (H) 03/16/2021    CHOLESTEROL 172 07/22/2020    CHOLESTEROL 214 (H) 03/13/2020     Lab Results   Component Value Date    HDL 71 03/16/2021    HDL 55 07/22/2020    HDL 72 03/13/2020     Lab Results   Component Value Date    LDLCALC 151 (H) 03/16/2021    LDLCALC 95 07/22/2020    LDLCALC 125 (H) 03/13/2020     Lab Results   Component Value Date    TRIG 85 03/16/2021    TRIG 110 07/22/2020    TRIG 84 03/13/2020     No results found for: [place], [place]  Lab Results   Component Value Date    VNS1DTLMBEAZ 1 390 03/16/2021     No results found for: HGBA1C  No results found for: PSA    Jorge L Santiago DO

## 2021-09-29 NOTE — PROGRESS NOTES
Assessment and Plan:     Problem List Items Addressed This Visit     None           Preventive health issues were discussed with patient, and age appropriate screening tests were ordered as noted in patient's After Visit Summary  Personalized health advice and appropriate referrals for health education or preventive services given if needed, as noted in patient's After Visit Summary       History of Present Illness:     Patient presents for Medicare Annual Wellness visit    Patient Care Team:  Norm Garcia DO as PCP - General (Family Medicine)     Problem List:     Patient Active Problem List   Diagnosis    Generalized anxiety disorder    Essential hypertension    Osteoarthritis, generalized    Irritable bowel syndrome with both constipation and diarrhea    Mixed hyperlipidemia    Ankle edema, bilateral    Chronic right shoulder pain    Medicare annual wellness visit, subsequent    Acute gout of left foot    Left foot pain    Morbid obesity with BMI of 40 0-44 9, adult [facility])      Past Medical and Surgical History:     Past Medical History:   Diagnosis Date    Hyperlipidemia     Hypertension      Past Surgical History:   Procedure Laterality Date    BREAST BIOPSY Left     5 years ago     JOINT REPLACEMENT        Family History:     Family History   Problem Relation Age of Onset    No Known Problems Mother     No Known Problems Sister     No Known Problems Daughter     No Known Problems Daughter     No Known Problems Daughter     No Known Problems Maternal Aunt     No Known Problems Maternal Aunt     Cancer Paternal Aunt     No Known Problems Father     No Known Problems Maternal Grandmother     Throat cancer Maternal Grandfather     No Known Problems Paternal Grandmother     No Known Problems Paternal Grandfather       Social History:     Social History     Socioeconomic History    Marital status: /Civil Union     Spouse name: None    Number of children: None    Years of education: None    Highest education level: None   Occupational History    None   Tobacco Use    Smoking status: Former Smoker     Packs/day: 0 50     Years: 20 00     Pack years: 10 00     Types: Cigarettes     Quit date:      Years since quittin 7    Smokeless tobacco: Never Used   Vaping Use    Vaping Use: Never used   Substance and Sexual Activity    Alcohol use: Not Currently    Drug use: Never    Sexual activity: None   Other Topics Concern    None   Social History Narrative    None     Social Determinants of Health     Financial Resource Strain:     Difficulty of Paying Living Expenses:    Food Insecurity:     Worried About Running Out of Food in the Last Year:     Ran Out of Food in the Last Year:    Transportation Needs:     Lack of Transportation (Medical):      Lack of Transportation (Non-Medical):    Physical Activity:     Days of Exercise per Week:     Minutes of Exercise per Session:    Stress:     Feeling of Stress :    Social Connections:     Frequency of Communication with Friends and Family:     Frequency of Social Gatherings with Friends and Family:     Attends Bahai Services:     Active Member of Clubs or Organizations:     Attends Club or Organization Meetings:     Marital Status:    Intimate Partner Violence:     Fear of Current or Ex-Partner:     Emotionally Abused:     Physically Abused:     Sexually Abused:       Medications and Allergies:     Current Outpatient Medications   Medication Sig Dispense Refill    allopurinol (ZYLOPRIM) 100 mg tablet Take 1 tablet (100 mg total) by mouth daily 90 tablet 0    aspirin (ECOTRIN LOW STRENGTH) 81 mg EC tablet Take 81 mg by mouth daily      Calcium Carb-Cholecalciferol (CALCIUM 600+D3 PO) Take by mouth      cyanocobalamin (VITAMIN B-12) 1,000 mcg tablet Take by mouth daily      furosemide (LASIX) 40 mg tablet TAKE 1 TABLET BY MOUTH DAILY 90 tablet 3    gabapentin (NEURONTIN) 100 mg capsule 200 mg 2 (two) times a day      metoprolol tartrate (LOPRESSOR) 100 mg tablet TAKE 1 TABLET BY MOUTH EVERY 12 HOURS 180 tablet 3    Multiple Vitamins-Minerals (MULTIVITAMIN WITH MINERALS) tablet Take 1 tablet by mouth daily      telmisartan (MICARDIS) 80 MG tablet TAKE 1 TABLET (80 MG TOTAL) BY MOUTH DAILY 90 tablet 0     No current facility-administered medications for this visit  Allergies   Allergen Reactions    Altace [Ramipril]       Immunizations:     Immunization History   Administered Date(s) Administered    Zoster Vaccine Recombinant 10/31/2020      Health Maintenance:         Topic Date Due    Hepatitis C Screening  Never done         Topic Date Due    COVID-19 Vaccine (1) Never done    DTaP,Tdap,and Td Vaccines (1 - Tdap) Never done    Pneumococcal Vaccine: 65+ Years (1 of 1 - PPSV23) Never done    Influenza Vaccine (1) 09/01/2021      Medicare Health Risk Assessment:     /70 (BP Location: Left arm, Patient Position: Sitting)   Pulse 59   Temp 98 9 °F (37 2 °C)   Resp 19   Ht 5' (1 524 m)   Wt 98 1 kg (216 lb 3 2 oz)   SpO2 96%   BMI 42 22 kg/m²          Health Risk Assessment:   Patient rates overall health as good  Patient feels that their physical health rating is same  Patient is very satisfied with their life  Eyesight was rated as slightly worse  Hearing was rated as same  Patient feels that their emotional and mental health rating is same  Patients states they are never, rarely angry  Patient states they are never, rarely unusually tired/fatigued  Pain experienced in the last 7 days has been some  Patient's pain rating has been 5/10  Patient states that she has experienced no weight loss or gain in last 6 months  Depression Screening:   PHQ-2 Score: 0      Fall Risk Screening: In the past year, patient has experienced: no history of falling in past year      Urinary Incontinence Screening:   Patient has not leaked urine accidently in the last six months       Home Safety:  Patient has trouble with stairs inside or outside of their home  Patient has working smoke alarms and has working carbon monoxide detector  Home safety hazards include: none  Nutrition:   Current diet is Regular and Limited junk food  Medications:   Patient is currently taking over-the-counter supplements  OTC medications include: see medication list  Patient is able to manage medications  Activities of Daily Living (ADLs)/Instrumental Activities of Daily Living (IADLs):   Walk and transfer into and out of bed and chair?: Yes  Dress and groom yourself?: Yes    Bathe or shower yourself?: Yes    Feed yourself? Yes  Do your laundry/housekeeping?: Yes  Manage your money, pay your bills and track your expenses?: Yes  Make your own meals?: Yes    Do your own shopping?: Yes    Previous Hospitalizations:   Any hospitalizations or ED visits within the last 12 months?: No      Advance Care Planning:   Living will: Yes    Durable POA for healthcare: Yes    Advanced directive: No      PREVENTIVE SCREENINGS      Cardiovascular Screening:    General: Screening Not Indicated and History Lipid Disorder      Diabetes Screening:     General: Screening Current      Breast Cancer Screening:     General: Screening Current      Cervical Cancer Screening:    General: Screening Not Indicated      Lung Cancer Screening:     General: Screening Not Indicated    Screening, Brief Intervention, and Referral to Treatment (SBIRT)    Screening  Typical number of drinks in a day: 0  Typical number of drinks in a week: 0  Interpretation: Low risk drinking behavior      AUDIT-C Screenin) How often did you have a drink containing alcohol in the past year? never  2) How many drinks did you have on a typical day when you were drinking in the past year? 0  3) How often did you have 6 or more drinks on one occasion in the past year? never    AUDIT-C Score: 0  Interpretation: Score 0-2 (female): Negative screen for alcohol misuse    Single Item Drug Screening:  How often have you used an illegal drug (including marijuana) or a prescription medication for non-medical reasons in the past year? never    Single Item Drug Screen Score: 0  Interpretation: Negative screen for possible drug use disorder    BMI Counseling: Body mass index is 42 22 kg/m²  The BMI is above normal  Nutrition recommendations include reducing portion sizes, decreasing overall calorie intake, 3-5 servings of fruits/vegetables daily, reducing fast food intake, moderation in carbohydrate intake, reducing intake of saturated fat and trans fat and reducing intake of cholesterol  Exercise recommendations include exercising 3-5 times per week    Cesar Daley, DO

## 2021-09-29 NOTE — ASSESSMENT & PLAN NOTE
Hypertension under stable control with current medication regimen no change in dosing avoid sodium re-evaluate in 6 months

## 2021-09-29 NOTE — ASSESSMENT & PLAN NOTE
Patient has bilateral ankle edema not causing pain at this time for her questions side effects from her medication I do recommend working on weight reduction avoidance of sodium in the wintertime she can work compression socks if tolerable and continue with Lasix as needed and as prescribed

## 2022-03-25 ENCOUNTER — RA CDI HCC (OUTPATIENT)
Dept: OTHER | Facility: HOSPITAL | Age: 77
End: 2022-03-25

## 2022-03-25 NOTE — PROGRESS NOTES
[id number]  [facility] 75  coding opportunities          Chart Reviewed number of suggestions sent to Provider: 1     Patients Insurance     Medicare Insurance: [employer]

## 2022-07-15 NOTE — ASSESSMENT & PLAN NOTE
Heart Attack   WHAT YOU NEED TO KNOW:   A heart attack happens when the blood vessels that supply blood to your heart are blocked  This can damage your heart or lead to an abnormal heart rhythm or heart failure  A heart attack is also called a myocardial infarction  DISCHARGE INSTRUCTIONS:   Call your local emergency number (911 in the [address] [facility]) for any of the following: You have any of the following signs of a heart attack:      Squeezing, pressure, or pain in your chest    You may  also have any of the following:     Discomfort or pain in your back, neck, jaw, stomach, or arm    Shortness of breath    Nausea or vomiting    Lightheadedness or a sudden cold sweat      Seek care immediately if:   You are tired and cannot think clearly  Your heart is beating faster than usual     You are bleeding from your gums or nose  You see blood in your urine or bowel movements  You urinate less than usual or not at all  You have new or increased swelling in your feet or ankles  Call your doctor or cardiologist if:   You have trouble taking your heart medicine  You have questions or concerns about your condition or care  Medicines: You may  need any of the following:  Heart medicines  help decrease blood pressure, control your heart rate, and help your heart function better  Nitroglycerin  opens the arteries to your heart, increases oxygen levels, and can decrease chest pain  You may get your nitroglycerin as a pill, a patch, or a paste  Ask your healthcare provider or cardiologist how to safely take this medicine  Aspirin  helps prevent clots from forming and causing blood flow problems  If healthcare providers want you to take aspirin daily, do not take acetaminophen or ibuprofen instead  Do not take more or less aspirin than healthcare providers say to take   If you are on another blood thinner medicine, ask your healthcare provider or cardiologist before you take aspirin for any Weight reduction avoidance of sodium continue with furosemide as needed reason  Antiplatelets , such as aspirin, help prevent blood clots  Take your antiplatelet medicine exactly as directed  These medicines make it more likely for you to bleed or bruise  If you are told to take aspirin, do not take acetaminophen or ibuprofen instead  Blood thinners  help prevent blood clots  Clots can cause strokes, heart attacks, and death  The following are general safety guidelines to follow while you are taking a blood thinner:    Watch for bleeding and bruising while you take blood thinners  Watch for bleeding from your gums or nose  Watch for blood in your urine and bowel movements  Use a soft washcloth on your skin, and a soft toothbrush to brush your teeth  This can keep your skin and gums from bleeding  If you shave, use an electric shaver  Do not play contact sports  Tell your dentist and other healthcare providers that you take a blood thinner  Wear a bracelet or necklace that says you take this medicine  Do not start or stop any other medicines unless your healthcare provider tells you to  Many medicines cannot be used with blood thinners  Take your blood thinner exactly as prescribed by your healthcare provider  Do not skip does or take less than prescribed  Tell your provider right away if you forget to take your blood thinner, or if you take too much  Warfarin  is a blood thinner that you may need to take  The following are things you should be aware of if you take warfarin:     Foods and medicines can affect the amount of warfarin in your blood  Do not make major changes to your diet while you take warfarin  Warfarin works best when you eat about the same amount of vitamin K every day  Vitamin K is found in green leafy vegetables and certain other foods  Ask for more information about what to eat when you are taking warfarin  You will need to see your healthcare provider for follow-up visits when you are on warfarin  You will need regular blood tests   These tests are used to decide how much medicine you need  Cholesterol medicine  decreases cholesterol and the amount of plaque in your blood  Do not take certain medicines without asking your healthcare provider first   These include NSAIDs, herbal or vitamin supplements, or hormones (estrogen or progestin)  Take your medicine as directed  Contact your healthcare provider if you think your medicine is not helping or if you have side effects  Tell him or her if you are allergic to any medicine  Keep a list of the medicines, vitamins, and herbs you take  Include the amounts, and when and why you take them  Bring the list or the pill bottles to follow-up visits  Carry your medicine list with you in case of an emergency  Cardiac rehabilitation (rehab)  is a program run by specialists who will help you safely strengthen your heart and prevent more heart disease  The plan includes exercise, relaxation, stress management, and heart-healthy nutrition  Healthcare providers will also check to make sure any medicines you take are working  The plan may also include instructions for when you can drive, return to work, and do other normal daily activities  Manage other health conditions:  Diabetes and high cholesterol increases your risk for another heart attack and stroke  Talk to your healthcare provider about your management plan  He or she will make a plan that helps you manage your conditions  Check your blood pressure at home:  High blood pressure can increase your risk for another heart attack  Sit and rest for 5 minutes before you take your blood pressure  Extend your arm and support it on a flat surface  Your arm should be at the same level as your heart  Follow the directions that came with your monitor  If possible, take at least 2 readings each time  Take your blood pressure at least 2 times each day at the same times, such as mornings and evenings   Keep a record of your readings and bring it to your follow-up visits  Ask your healthcare provider what your blood pressure should be  Get a flu vaccine every year as soon as it is available: The vaccine will help prevent the flu  A heart attack will make it harder for you to fight off the flu virus on your own  The flu may also be worse for you than for a person who has not had a heart attack  Ask about other vaccinations you may need  Lifestyle changes you may need to make after a heart attack: Follow a heart-healthy diet  A heart-healthy diet is an eating plan low in total fat, unhealthy fats, and sodium (salt)  A heart-healthy diet helps decrease your risk for heart disease and stroke  Limit the amount of fat you eat to 25% to 35% of your total daily calories  Your healthcare provider may recommend the DASH (Dietary Approaches to Stop Hypertension) Eating Plan to help lower high blood pressure and LDL (bad) cholesterol  The plan is low in sodium, sugar, unhealthy fats, and total fat  It is high in potassium, calcium, magnesium, and fiber  Ask for more information about this plan  Limit sodium (salt) as directed  Too much sodium can affect your fluid balance  Check labels to find low-sodium or no-salt-added foods  Some low-sodium foods use potassium salts for flavor  Too much potassium can also cause health problems  Your healthcare provider will tell you how much sodium and potassium are safe for you to have in a day  He or she may recommend that you limit sodium to 2,300 mg a day  Do not smoke  Nicotine and other chemicals in cigarettes and cigars can cause lung and heart damage  Ask your healthcare provider for information if you currently smoke and need help to quit  E-cigarettes or smokeless tobacco still contain nicotine  Talk to your healthcare provider before you use these products  Exercise as directed  Ask your healthcare provider about the best exercise plan for you   Exercise makes your heart stronger, lowers blood pressure, and helps prevent a heart attack  The goal is 30 to 60 minutes a day, 5 to 7 days a week  You may have to work up to this goal  Healthcare providers can help you reach this goal, starting in cardiac rehab sessions  Maintain a healthy weight  Ask your healthcare provider how much you should weigh  He or she can help you create a safe weight loss plan if you are overweight  Manage stress  Stress may increase your risk for another heart attack  Learn ways to control stress, such as relaxation, deep breathing, and music  Talk to someone about things that upset you  Follow up with your healthcare provider or cardiologist within 14 days or as directed:  Write down your questions so you remember to ask them during your visits  © Copyright [employer] 2022 Information is for End User's use only and may not be sold, redistributed or otherwise used for commercial purposes  All illustrations and images included in CareNotes® are the copyrighted property of [employer]  or [employer]   The above information is an  only  It is not intended as medical advice for individual conditions or treatments  Talk to your doctor, nurse or pharmacist before following any medical regimen to see if it is safe and effective for you  After Coronary Angioplasty and Intravascular Stent Placement   AMBULATORY CARE:   After coronary angioplasty and intravascular stent placement,  you will need to move carefully for 48 hours  Your healthcare provider will give you specific activity instructions to follow while you heal  You will also need to care for your procedure wound to prevent infection and help it heal   Call 911 for any of the following:    You have any of the following signs of a heart attack:      Squeezing, pressure, or pain in your chest    You may  also have any of the following:     Discomfort or pain in your back, neck, jaw, stomach, or arm    Shortness of breath    Nausea or vomiting    Lightheadedness or a sudden cold sweat    You have any of the following signs of a stroke:      Numbness or drooping on one side of your face     Weakness in an arm or leg    Confusion or difficulty speaking    Dizziness, a severe headache, or vision loss    You cough up blood  Seek care immediately if:   Your arm or leg feels warm, tender, and painful  It may look swollen and red  Your leg or arm becomes numb, or your fingers or toes turn white or blue  The area where the catheter was placed is swollen, red, or has pus or foul-smelling fluid coming from it  You start to bleed from your catheter site again  Contact your cardiologist if:   You have a fever or chills  You have questions or concerns about your condition or care  Medicines: You may be given any of the following:  Antiplatelets  prevent blood clots from forming  You will need to take aspirin and another type of antiplatelet medicine  Take this medicine daily as directed  Do not stop taking aspirin or other type of antiplatelet medicine without asking your healthcare provider  Cholesterol medicine  helps decrease the amount of cholesterol in your blood  Too much cholesterol in your blood may cause plaque buildup  Blood pressure medicine  lowers your blood pressure  Take your medicine as directed  Contact your healthcare provider if you think your medicine is not helping or if you have side effects  Tell him or her if you are allergic to any medicine  Keep a list of the medicines, vitamins, and herbs you take  Include the amounts, and when and why you take them  Bring the list or the pill bottles to follow-up visits  Carry your medicine list with you in case of an emergency  Drink liquids as directed:  Drink extra liquids if contrast liquid was used during your procedure  Liquid will help flush the contrast out of your body   Ask your healthcare provider how much liquid to drink each day and which liquids are best for you  Activity:   Rest for 1 or 2 days after your procedure  If you had a heart attack, you may need to rest longer  Increase activity slowly until you reach your normal level of activity  Limits after groin insertion: The following will reduce pressure on your catheter site and prevent bleeding:  Do not lift more than 10 pounds for 1 week  Do not strain to have a bowel movement  Avoid intense exercise for 2 to 4 weeks  If you need to cough, support the catheter site area with your hand  Ask your healthcare provider how long these limits should last     Limits after wrist insertion: The following will reduce pressure on your catheter site and prevent bleeding:  Do not use your wrist to lift more than 2 pounds  Avoid activities that use your wrist, such as tennis, bowling, and golf  Do not push or pull items  If you need to cough, support the catheter site area with your hand  Ask your healthcare provider how long these limits should last     Wound care: Most bandages can be removed the day after your procedure  Gently clean the catheter site with soap and water daily  Do not rub it  Do not  soak in a tub, swimming pool, or hot tub until your healthcare provider says it is okay  Do not smoke:  Nicotine and other chemicals in cigarettes and cigars can cause heart damage  Ask your healthcare provider for information if you currently smoke and need help to quit  E-cigarettes or smokeless tobacco still contain nicotine  Talk to your healthcare provider before you use these products  Cardiac rehab:  Your cardiologist may recommend that you attend cardiac rehabilitation (rehab)  This is a program run by specialists who will help you safely strengthen your heart and reduce the risk for more heart disease  The plan includes exercise, relaxation, stress management, and heart-healthy nutrition   Healthcare providers will also check to make sure any medicines you are taking are working  Follow up with your cardiologist as directed: You may need more tests  If you need an MRI, wait at least 6 to 8 weeks after stent placement, or as directed  Write down your questions so you remember to ask them during your visits  © Copyright [address] 2022 Information is for End User's use only and may not be sold, redistributed or otherwise used for commercial purposes  All illustrations and images included in CareNotes® are the copyrighted property of [employer]  or [facility] Meche Joseph   The above information is an  only  It is not intended as medical advice for individual conditions or treatments  Talk to your doctor, nurse or pharmacist before following any medical regimen to see if it is safe and effective for you

## 2022-10-21 NOTE — PROGRESS NOTES
Assessment/Plan:       Problem List Items Addressed This Visit        Cardiovascular and Mediastinum    Essential hypertension - Primary      Hypertension stable continue with metoprolol 100 mg tablets along with telmisartan 80 mg tablets         Relevant Orders    CBC and differential    Comprehensive metabolic panel    Lipid panel    TSH, 3rd generation with Free T4 reflex       Musculoskeletal and Integument    Osteoarthritis, generalized      Generalized osteoarthritis continue with weight reduction and daily exercise along with allopurinol 100 mg         Relevant Orders    CBC and differential    Comprehensive metabolic panel    Lipid panel    TSH, 3rd generation with Free T4 reflex       Other    Generalized anxiety disorder      Continue medications as directed         Relevant Orders    CBC and differential    Comprehensive metabolic panel    Lipid panel    TSH, 3rd generation with Free T4 reflex    Mixed hyperlipidemia      Continue heart healthy diet avoiding saturated fats reviewed laboratory work and repeat at next office visit         Relevant Orders    CBC and differential    Comprehensive metabolic panel    Lipid panel    TSH, 3rd generation with Free T4 reflex    Ankle edema, bilateral      Weight reduction avoidance of sodium continue with furosemide as needed         Relevant Orders    CBC and differential    Comprehensive metabolic panel    Lipid panel    TSH, 3rd generation with Free T4 reflex    Body mass index (BMI) 45 0-49 9, adult (HCC)    Relevant Orders    CBC and differential    Comprehensive metabolic panel    Lipid panel    TSH, 3rd generation with Free T4 reflex            Subjective:      Patient ID: Stanley Meier is a 64 y o  female       Patient is presenting for general 4 month checkup and lab review      The following portions of the patient's history were reviewed and updated as appropriate: allergies, current medications, past family history, past medical history, past social history, past surgical history and problem list     Review of Systems   Constitutional: Negative for chills, fatigue and fever  HENT: Negative for congestion, nosebleeds, rhinorrhea, sinus pressure and sore throat  Eyes: Negative for discharge and redness  Respiratory: Negative for cough and shortness of breath  Cardiovascular: Negative for chest pain, palpitations and leg swelling  Gastrointestinal: Negative for abdominal pain, blood in stool and nausea  Endocrine: Negative for cold intolerance, heat intolerance and polyuria  Genitourinary: Negative for dysuria and frequency  Musculoskeletal: Positive for arthralgias and back pain  Negative for myalgias  Skin: Negative for rash  Neurological: Negative for dizziness, weakness and headaches  Hematological: Negative for adenopathy  Psychiatric/Behavioral: Negative for behavioral problems and sleep disturbance  The patient is not nervous/anxious  Objective:      /80   Pulse 91   Temp 97 5 °F (36 4 °C)   Ht 5' (1 524 m)   Wt 103 kg (227 lb)   SpO2 97%   BMI 44 33 kg/m²        Physical Exam  Vitals signs and nursing note reviewed  Constitutional:       General: She is not in acute distress  Appearance: She is well-developed  She is obese  HENT:      Head: Normocephalic and atraumatic  Right Ear: Tympanic membrane and external ear normal       Left Ear: Tympanic membrane and external ear normal       Nose: Nose normal       Mouth/Throat:      Mouth: Mucous membranes are moist       Pharynx: Oropharynx is clear  No oropharyngeal exudate  Eyes:      General: No scleral icterus  Right eye: No discharge  Left eye: No discharge  Conjunctiva/sclera: Conjunctivae normal       Pupils: Pupils are equal, round, and reactive to light  Neck:      Musculoskeletal: Normal range of motion  Thyroid: No thyromegaly  Vascular: No JVD     Cardiovascular:      Rate and Rhythm: Normal rate and regular rhythm  Heart sounds: Normal heart sounds  No murmur  Pulmonary:      Effort: Pulmonary effort is normal       Breath sounds: No wheezing or rales  Chest:      Chest wall: No tenderness  Abdominal:      General: Bowel sounds are normal  There is no distension  Palpations: Abdomen is soft  There is no mass  Tenderness: There is no abdominal tenderness  Musculoskeletal: Normal range of motion  General: No tenderness or deformity  Right lower leg: Edema present  Left lower leg: Edema present  Lymphadenopathy:      Cervical: No cervical adenopathy  Skin:     General: Skin is warm and dry  Findings: No rash  Neurological:      Mental Status: She is alert and oriented to person, place, and time  Cranial Nerves: No cranial nerve deficit  Coordination: Coordination normal       Deep Tendon Reflexes: Reflexes are normal and symmetric  Reflexes normal    Psychiatric:         Mood and Affect: Mood normal          Behavior: Behavior normal          Thought Content: Thought content normal          Judgment: Judgment normal           Data:    Laboratory Results: I have personally reviewed the pertinent laboratory results/reports   Radiology/Other Diagnostic Testing Results: I have personally reviewed pertinent reports         Lab Results   Component Value Date    WBC 7 12 03/16/2021    HGB 13 3 03/16/2021    HCT 41 4 03/16/2021    MCV 97 03/16/2021     03/16/2021     Lab Results   Component Value Date     04/16/2018    K 4 0 03/16/2021     (H) 03/16/2021    CO2 28 03/16/2021    ANIONGAP 11 9 04/16/2018    BUN 32 (H) 03/16/2021    CREATININE 0 98 03/16/2021    GLUF 87 03/16/2021    CALCIUM 9 7 03/16/2021    AST 21 03/16/2021    ALT 33 03/16/2021    ALKPHOS 68 03/16/2021    PROT 6 3 (L) 04/16/2018    BILITOT 0 4 04/16/2018    EGFR 55 03/16/2021     Lab Results   Component Value Date    CHOLESTEROL 239 (H) 03/16/2021    CHOLESTEROL 172 07/22/2020 CHOLESTEROL 214 (H) 03/13/2020     Lab Results   Component Value Date    HDL 71 03/16/2021    HDL 55 07/22/2020    HDL 72 03/13/2020     Lab Results   Component Value Date    LDLCALC 151 (H) 03/16/2021    LDLCALC 95 07/22/2020    LDLCALC 125 (H) 03/13/2020     Lab Results   Component Value Date    TRIG 85 03/16/2021    TRIG 110 07/22/2020    TRIG 84 03/13/2020     No results found for: [place], [place]  Lab Results   Component Value Date    VRY0VWDONKDW 1 390 03/16/2021     No results found for: HGBA1C  No results found for: PSA    Norm PALMA'Dm, DO Helical Rim Text: The closure involved the helical rim.

## 2023-03-24 ENCOUNTER — TELEPHONE (OUTPATIENT)
Dept: FAMILY MEDICINE CLINIC | Facility: CLINIC | Age: 78
End: 2023-03-24

## 2023-03-30 NOTE — TELEPHONE ENCOUNTER
03/30/23 3:48 PM     The office's request has been received, reviewed, and the patient chart updated. The PCP has successfully been removed with a patient attribution note.     Please remind staff to not remove PCP at office level for this scenario; [facility] Department will remove.     This message will now be completed.    Thank you  Sarah Alexander

## 2024-02-21 PROBLEM — Z00.00 MEDICARE ANNUAL WELLNESS VISIT, SUBSEQUENT: Status: RESOLVED | Noted: 2020-07-15 | Resolved: 2024-02-21
